# Patient Record
Sex: FEMALE | Race: WHITE | Employment: OTHER | URBAN - METROPOLITAN AREA
[De-identification: names, ages, dates, MRNs, and addresses within clinical notes are randomized per-mention and may not be internally consistent; named-entity substitution may affect disease eponyms.]

---

## 2018-07-23 ENCOUNTER — HOSPITAL ENCOUNTER (OUTPATIENT)
Dept: MRI IMAGING | Age: 73
Discharge: HOME | End: 2018-07-25
Payer: COMMERCIAL

## 2019-06-25 ENCOUNTER — CLINICAL DOCUMENTATION (OUTPATIENT)
Dept: FAMILY MEDICINE CLINIC | Age: 74
End: 2019-06-25

## 2019-06-26 ENCOUNTER — OFFICE VISIT (OUTPATIENT)
Dept: FAMILY MEDICINE CLINIC | Age: 74
End: 2019-06-26
Payer: COMMERCIAL

## 2019-06-26 VITALS
OXYGEN SATURATION: 97 % | HEIGHT: 66 IN | BODY MASS INDEX: 37.28 KG/M2 | WEIGHT: 232 LBS | HEART RATE: 72 BPM | DIASTOLIC BLOOD PRESSURE: 70 MMHG | SYSTOLIC BLOOD PRESSURE: 126 MMHG

## 2019-06-26 DIAGNOSIS — R10.84 GENERALIZED ABDOMINAL PAIN: Primary | ICD-10-CM

## 2019-06-26 DIAGNOSIS — F51.01 PRIMARY INSOMNIA: ICD-10-CM

## 2019-06-26 DIAGNOSIS — F41.9 ANXIETY: ICD-10-CM

## 2019-06-26 DIAGNOSIS — R05.9 COUGH: ICD-10-CM

## 2019-06-26 PROCEDURE — 99214 OFFICE O/P EST MOD 30 MIN: CPT | Performed by: INTERNAL MEDICINE

## 2019-06-26 RX ORDER — ZOLPIDEM TARTRATE 10 MG/1
TABLET ORAL
Qty: 30 TABLET | Refills: 2 | Status: SHIPPED | OUTPATIENT
Start: 2019-06-26 | End: 2019-08-19 | Stop reason: SDUPTHER

## 2019-06-26 RX ORDER — LABETALOL 100 MG/1
TABLET, FILM COATED ORAL
Refills: 2 | COMMUNITY
Start: 2019-05-23 | End: 2020-06-16 | Stop reason: SDUPTHER

## 2019-06-26 RX ORDER — LORAZEPAM 0.5 MG/1
0.5 TABLET ORAL NIGHTLY
COMMUNITY
End: 2019-06-26 | Stop reason: SDUPTHER

## 2019-06-26 RX ORDER — LORAZEPAM 0.5 MG/1
0.5 TABLET ORAL NIGHTLY
Qty: 30 TABLET | Refills: 2 | Status: SHIPPED | OUTPATIENT
Start: 2019-06-26 | End: 2021-06-22

## 2019-06-26 RX ORDER — OXYBUTYNIN CHLORIDE 5 MG/1
5 TABLET ORAL EVERY 6 HOURS PRN
Qty: 236 ML | Refills: 1 | Status: SHIPPED | OUTPATIENT
Start: 2019-06-26 | End: 2019-07-26

## 2019-06-26 RX ORDER — OXYBUTYNIN CHLORIDE 5 MG/1
TABLET ORAL
Refills: 0 | COMMUNITY
Start: 2019-04-02 | End: 2019-06-26 | Stop reason: SDUPTHER

## 2019-06-26 RX ORDER — ASCORBIC ACID 500 MG
TABLET ORAL
COMMUNITY
Start: 2005-11-30

## 2019-06-26 NOTE — PROGRESS NOTES
She is complaining of abdominal pain. She states that she has the pain in the epigastric area postprandially. Patient also states that she has severe pain with bowel movements. She denies any blood or mucus in the stool. She states that she gets lightheaded just does not feel very good has to lay down after bowel movements. Denies fever, chills, sweats. There is been a minimal amount of weight loss over the last 6 months. And states that she has had a colonoscopy with the last 7 or 8 years. She states that all of her colonoscopies x3 have been totally normal.  She does have a high degree of anxiety. HPI:    Current Meds: Eszopiclone 3 mg one by mouth every night at bedtime as needed  Cholestyramine 4 GM/Dose one scoop daily  Duloxetine HCL 60 mg 1 by mouth every day  Cheratussin ac 100-10 mg/5ml 5ml q6hr as needed  Levothyroxine Sodium 75 mcg 1 PO qd  Pantoprazole Sodium 40 mg 1 PO qd  Losartan Potassium 100 mg 1 by mouth every day  Furosemide 40 mg 1 by mouth bid  Potassium Gluconate 595 (99 k) mg  Amlodipine Besylate 10 mg take 1 tablet by mouth one time per day  Allergies: NKDA  PMH:  Problem List: Essential hypertension  Health Maintenance:  Influenza Vaccination - (2018)  Mammogram - 2017  Colonoscopy - about 5 yrs ago, has had 3 since 46  Medical Problems:  Hypothyroidism, Hypertension, Anxiety, Osteoarthritis  Pulmonary HTN - (2018) [facility]-RIGHT SIDED HEART CATH  Incontinence, Peripheral Neuropathy  Diastolic Dysfunction - (5230) CARDIOLOGY/PULMONARY CONSULTS   Sleep Apnea - (2018) RECENT ADJUSTMENT OF CPAP  Surgical Hx:  Cataract Removal, Multiple urologic surgeries including stim, Removal of Gallbladder, Appendectomy,  Partial Hysterectomy  Reviewed, no changes. FH:  Father:  . (Hx)  due to CAD; Throat Cancer. Mother:  . (Hx)  due to Old age ? unknown. Brother 1:  . (Hx)  due to Pulmonary fibrosis. Brother 2:  . (Hx)  due to MI - age 47.   Sister 1:  Coronary Artery Disease (CAD). Maternal Grandmother:  . (Hx)  due to Colon Cancer. Reviewed, no changes. SH:  Marital: Legal Status: . Personal Habits: Cigarette Use: Never Smoked Cigarettes. Alcohol: Social Very Rare. Drug Use:  Denies Drug Use. Reviewed, no changes. Date: 2019  Was the patient queried about smoking behavior? Yes No  Does the patient currently smoke? Smoking: Nonsmoker. Objective  BP: 128/86 Pulse: 88 O2SatR: 96%ra Ht: 66\" 5'6\" Ht cm: 167.6 Wt: 238lb Wt Prior: 241lb as of  18 Wt Dif: -3lb Wt k.957 Wt kg Prior: 109.318 as of 18 Wt kg Dif: -1.361 BMI: 38.4  BSA: 2.15  Exam:  Const: Appears healthy, well developed and well nourished. Appears morbidly obese. Eyes: EOMI in both eyes. PERRL. ENMT: External ears WNL. Tympanic membranes are intact. External nose WNL. Moistness and  normal color of the nasal mucosae. Septum is in the midline. Dentition is in good repair. Gums  appear healthy. Posterior pharynx shows no exudate, irritation or redness. Neck: Supple and symmetric. Palpation reveals no adenopathy. No masses appreciated. Thyroid:  no nodule appreciated or thyromegaly. No jugular venous distention. Resp: Respirations are unlabored. Respiration rate is normal. Auscultate good airflow. No rales,  rhonchi or wheezes appreciated over the lungs bilaterally. CV: Rhythm is regular. S1 is normal. S2 is accentuated. Carotids: no bruits. Abdominal aorta: not  palpable. Pedal pulses: 2+ and equal bilaterally. Extremities: No clubbing, cyanosis or edema. Abdomen: Bowel sounds are normoactive. Palpation reveals softness, with no distension,  organomegaly or tenderness. No abdominal masses palpable. No palpable hepatosplenomegaly. Musculo: Walks with a wide-based gait. Upper Extremities: ROM: Full ROM bilaterally. Lower  Extremities: ROM: Full ROM bilaterally. Skin: Dry and warm with no rash. Neuro: Alert and oriented x3.  Mood is normal. Affect is normal. Speech is articulate and fluent. DTR's are symmetric, intact and 2+ bilaterally. ........... Lord William Chavez was seen today for gi problem. Diagnoses and all orders for this visit:    Generalized abdominal pain  -     LORazepam (ATIVAN) 0.5 MG tablet; Take 1 tablet by mouth nightly for 90 days.  -     LIPASE; Future  -     COMPREHENSIVE METABOLIC PANEL; Future  -     CBC; Future  -     Sedimentation rate, automated; Future  -     External Referral To Gastroenterology    Anxiety  -     LORazepam (ATIVAN) 0.5 MG tablet; Take 1 tablet by mouth nightly for 90 days. Cough  -     VIRTUSSIN A/C 100-10 MG/5ML syrup; Take 5 mLs by mouth every 6 hours as needed for Cough for up to 30 days. Primary insomnia  -     zolpidem (AMBIEN) 10 MG tablet; TAKE ONE TABLET BY MOUTH EVERY DAY AT BEDTIME    Given her high degree of anxiety and somewhat nebulous symptomatology I believe this is probably a functional bowel problem. I will send her to GI for further evaluation. Really did not think it warranted EGD colonoscopy and CT of the abdomen and pelvis at this time especially with a negative examination. I will ask her to take VS L #3 and report to gastroenterology whether this has been helpful.

## 2019-06-26 NOTE — PROGRESS NOTES
Last office note copied from [facility] for Dr. Mariola Irwin [address] Acct#: [id number]  Suraj Malagon : 1945 Sex: F Age: 74 years  Subjective  CC: This is a patient with continued some postnasal drip and cough. Patient will be prescribed some  Flonase for this. Patient recently saw cardiology and she was started on Coreg for better blood pressure  control. She is doing much better in terms of performance status. She is less weak and less short of  breath. She did go to cardio pulmonary rehabilitation and now is having a  at home for  core strengthening. Patient does have chronic lymphedema but her weight is actually down I am  proceeding that her edema is actually a little bit less than it was previously. Complaining of insomnia. She does wear a CPAP. She would like a bigger dose of Ambien but I indicated to her that a 5 mg dose  is all that's FDA approved for insomnia and women. We've tried other medications without success. Patient is also complaining of occasional depression. She is on Celexa teen. There is no suicidal  ideation but she was inquiring about ketamine. Told her this is really not indicated in her case. HPI:  ROS:  Const: General health stated as fair. Eyes: Denies eye symptoms. ENMT: Denies ear symptoms. Denies nasal symptoms. CV: Reports hypertension and palpitations. Resp: Reports SOB and SOB with minimal activity, diastolic dysfunction with severely enlarged left  atrium. : Stress incontinence  Musculo: Reports arthralgias. Neuro: Peripheral neuropathy  Psych: Reports anxiety and depression. Endocrine: Reports obesity. Hema/Lymph: Denies hematologic symptoms.   Current Meds: Eszopiclone 3 mg one by mouth every night at bedtime as needed  Cholestyramine 4 GM/Dose one scoop daily  Duloxetine HCL 60 mg 1 by mouth every day  Cheratussin ac 100-10 mg/5ml 5ml q6hr as needed  Levothyroxine Sodium 75 mcg 1 PO qd  Pantoprazole Sodium 40 mg 1 PO qd  Losartan Potassium 100 mg 1 by mouth every day  Furosemide 40 mg 1 by mouth bid  Potassium Gluconate 595 (99 k) mg  Amlodipine Besylate 10 mg take 1 tablet by mouth one time per day  Allergies: NKDA  PMH:  Problem List: Essential hypertension  Health Maintenance:  Influenza Vaccination - (2018)  Mammogram - 2017  Colonoscopy - about 5 yrs ago, has had 3 since 46  Medical Problems:  Hypothyroidism, Hypertension, Anxiety, Osteoarthritis  Stacey Mccarthy  1945 Page #2  Pulmonary HTN - (2018) [facility]-RIGHT SIDED HEART CATH  Incontinence, Peripheral Neuropathy  Diastolic Dysfunction - () CARDIOLOGY/PULMONARY CONSULTS   Sleep Apnea - (2018) RECENT ADJUSTMENT OF CPAP  Surgical Hx:  Cataract Removal, Multiple urologic surgeries including stim, Removal of Gallbladder, Appendectomy,  Partial Hysterectomy  Reviewed, no changes. FH:  Father:  . (Hx)  due to CAD; Throat Cancer. Mother:  . (Hx)  due to Old age ? unknown. Brother 1:  . (Hx)  due to Pulmonary fibrosis. Brother 2:  . (Hx)  due to MI - age 47. Sister 1:  Coronary Artery Disease (CAD). Maternal Grandmother:  . (Hx)  due to Colon Cancer. Reviewed, no changes. SH:  Marital: Legal Status: . Personal Habits: Cigarette Use: Never Smoked Cigarettes. Alcohol: Social Very Rare. Drug Use:  Denies Drug Use. Reviewed, no changes. Date: 2019  Was the patient queried about smoking behavior? Yes No  Does the patient currently smoke? Smoking: Nonsmoker. Objective  BP: 128/86 Pulse: 88 O2SatR: 96%ra Ht: 66\" 5'6\" Ht cm: 167.6 Wt: 238lb Wt Prior: 241lb as of  18 Wt Dif: -3lb Wt k.957 Wt kg Prior: 109.318 as of 18 Wt kg Dif: -1.361 BMI: 38.4  BSA: 2.15  Exam:  Const: Appears healthy, well developed and well nourished. Appears morbidly obese. Eyes: EOMI in both eyes. PERRL. ENMT: External ears WNL. Tympanic membranes are intact. External nose WNL.  Moistness and  normal color of the nasal mucosae. Septum is in the midline. Dentition is in good repair. Gums  appear healthy. Posterior pharynx shows no exudate, irritation or redness. Neck: Supple and symmetric. Palpation reveals no adenopathy. No masses appreciated. Thyroid:  no nodule appreciated or thyromegaly. No jugular venous distention. Resp: Respirations are unlabored. Respiration rate is normal. Auscultate good airflow. No rales,  rhonchi or wheezes appreciated over the lungs bilaterally. CV: Rhythm is regular. S1 is normal. S2 is accentuated. Carotids: no bruits. Abdominal aorta: not  palpable. Pedal pulses: 2+ and equal bilaterally. Extremities: No clubbing, cyanosis or edema. Abdomen: Bowel sounds are normoactive. Palpation reveals softness, with no distension,  organomegaly or tenderness. No abdominal masses palpable. No palpable hepatosplenomegaly. Musculo: Walks with a wide-based gait. Upper Extremities: ROM: Full ROM bilaterally. Lower  Extremities: ROM: Full ROM bilaterally. Skin: Dry and warm with no rash. [facility]  1945 Page #3  Neuro: Alert and oriented x3. Mood is normal. Affect is normal. Speech is articulate and fluent. DTR's are symmetric, intact and 2+ bilaterally. Assessment #1: J30.9 Allergic rhinitis, unspecified  Care Plan:  Lab Orders : CMP W/GFR  Magnesium  Assessment #2: I10 Essential (primary) hypertension  Care Plan:  Lab Orders : CMP W/GFR  Magnesium  Assessment #3: G47.00 Insomnia, unspecified  Care Plan:  Med Current : Eszopiclone 3 mg  one by mouth every night at bedtime as needed  Follow Up : Followup:. Assessment #4: G47.30 Sleep apnea, unspecified  Care Plan:  Assessment #5: M15.0 Primary generalized (osteo)arthritis  Care Plan:  Assessment #6: Z68.38 Body mass index (BMI) 38.0-38.9, adult  Care Plan:  Patient is currently titrating dose of Coreg up. She does have follow-up with cardiology. Lab  work will be obtained today. Immunizations are up to date.

## 2019-07-01 ENCOUNTER — HOSPITAL ENCOUNTER (OUTPATIENT)
Age: 74
Discharge: HOME | End: 2019-07-03
Payer: COMMERCIAL

## 2019-07-01 DIAGNOSIS — R10.84 GENERALIZED ABDOMINAL PAIN: ICD-10-CM

## 2019-07-01 LAB
ALBUMIN SERPL-MCNC: 4.6 G/DL (ref 3.5–5.2)
ALP BLD-CCNC: 87 U/L (ref 35–104)
ALT SERPL-CCNC: 13 U/L (ref 0–32)
ANION GAP SERPL CALCULATED.3IONS-SCNC: 17 MMOL/L (ref 7–16)
AST SERPL-CCNC: 21 U/L (ref 0–31)
BILIRUB SERPL-MCNC: 0.4 MG/DL (ref 0–1.2)
BUN BLDV-MCNC: 13 MG/DL (ref 8–23)
CALCIUM SERPL-MCNC: 9.9 MG/DL (ref 8.6–10.2)
CHLORIDE BLD-SCNC: 98 MMOL/L (ref 98–107)
CO2: 22 MMOL/L (ref 22–29)
CREAT SERPL-MCNC: 1.4 MG/DL (ref 0.5–1)
GFR AFRICAN AMERICAN: 44
GFR NON-AFRICAN AMERICAN: 37 ML/MIN/1.73
GLUCOSE BLD-MCNC: 101 MG/DL (ref 74–99)
HCT VFR BLD CALC: 39.7 % (ref 34–48)
HEMOGLOBIN: 13 G/DL (ref 11.5–15.5)
LIPASE: 40 U/L (ref 13–60)
MCH RBC QN AUTO: 32.8 PG (ref 26–35)
MCHC RBC AUTO-ENTMCNC: 32.7 % (ref 32–34.5)
MCV RBC AUTO: 100.3 FL (ref 80–99.9)
PDW BLD-RTO: 13.3 FL (ref 11.5–15)
PLATELET # BLD: 365 E9/L (ref 130–450)
PMV BLD AUTO: 10.4 FL (ref 7–12)
POTASSIUM SERPL-SCNC: 4.8 MMOL/L (ref 3.5–5)
RBC # BLD: 3.96 E12/L (ref 3.5–5.5)
SEDIMENTATION RATE, ERYTHROCYTE: 13 MM/HR (ref 0–20)
SODIUM BLD-SCNC: 137 MMOL/L (ref 132–146)
TOTAL PROTEIN: 8 G/DL (ref 6.4–8.3)
WBC # BLD: 7.1 E9/L (ref 4.5–11.5)

## 2019-07-01 PROCEDURE — 83690 ASSAY OF LIPASE: CPT

## 2019-07-01 PROCEDURE — 80053 COMPREHEN METABOLIC PANEL: CPT

## 2019-07-01 PROCEDURE — 36415 COLL VENOUS BLD VENIPUNCTURE: CPT

## 2019-07-01 PROCEDURE — 85651 RBC SED RATE NONAUTOMATED: CPT

## 2019-07-01 PROCEDURE — 85027 COMPLETE CBC AUTOMATED: CPT

## 2019-07-02 ENCOUNTER — TELEPHONE (OUTPATIENT)
Dept: FAMILY MEDICINE CLINIC | Age: 74
End: 2019-07-02

## 2019-07-15 ENCOUNTER — TELEPHONE (OUTPATIENT)
Dept: FAMILY MEDICINE CLINIC | Age: 74
End: 2019-07-15

## 2019-08-07 ENCOUNTER — TELEPHONE (OUTPATIENT)
Dept: FAMILY MEDICINE CLINIC | Age: 74
End: 2019-08-07

## 2019-08-19 ENCOUNTER — TELEPHONE (OUTPATIENT)
Dept: FAMILY MEDICINE CLINIC | Age: 74
End: 2019-08-19

## 2019-08-19 DIAGNOSIS — F51.01 PRIMARY INSOMNIA: ICD-10-CM

## 2019-08-21 RX ORDER — ZOLPIDEM TARTRATE 10 MG/1
TABLET ORAL
Qty: 30 TABLET | Refills: 1 | Status: SHIPPED | OUTPATIENT
Start: 2019-08-21 | End: 2021-06-22 | Stop reason: SDUPTHER

## 2019-08-22 DIAGNOSIS — F51.01 PRIMARY INSOMNIA: ICD-10-CM

## 2019-08-22 RX ORDER — ZOLPIDEM TARTRATE 10 MG/1
TABLET ORAL
Qty: 30 TABLET | Refills: 1 | Status: SHIPPED | OUTPATIENT
Start: 2019-08-22 | End: 2019-10-15 | Stop reason: SDUPTHER

## 2019-10-11 RX ORDER — CARVEDILOL 6.25 MG/1
6.25 TABLET ORAL 2 TIMES DAILY WITH MEALS
COMMUNITY
End: 2019-10-15 | Stop reason: CLARIF

## 2019-10-11 RX ORDER — MAGNESIUM GLUCONATE 30 MG(550)
TABLET ORAL
COMMUNITY
End: 2019-10-15 | Stop reason: CLARIF

## 2019-10-11 RX ORDER — FLUTICASONE PROPIONATE 50 MCG
2 SPRAY, SUSPENSION (ML) NASAL DAILY
COMMUNITY
End: 2020-04-02 | Stop reason: SDUPTHER

## 2019-10-11 RX ORDER — CHOLESTYRAMINE 4 G/9G
1 POWDER, FOR SUSPENSION ORAL DAILY
COMMUNITY
End: 2019-10-15 | Stop reason: CLARIF

## 2019-10-11 RX ORDER — GUAIFENESIN AND CODEINE PHOSPHATE 100; 10 MG/5ML; MG/5ML
5 SOLUTION ORAL EVERY 6 HOURS
COMMUNITY
End: 2019-10-15 | Stop reason: SDUPTHER

## 2019-10-15 ENCOUNTER — OFFICE VISIT (OUTPATIENT)
Dept: FAMILY MEDICINE CLINIC | Age: 74
End: 2019-10-15
Payer: COMMERCIAL

## 2019-10-15 VITALS
OXYGEN SATURATION: 98 % | BODY MASS INDEX: 36.64 KG/M2 | HEART RATE: 83 BPM | WEIGHT: 227 LBS | SYSTOLIC BLOOD PRESSURE: 130 MMHG | DIASTOLIC BLOOD PRESSURE: 74 MMHG

## 2019-10-15 DIAGNOSIS — F51.01 PRIMARY INSOMNIA: ICD-10-CM

## 2019-10-15 DIAGNOSIS — R05.9 COUGH: Primary | ICD-10-CM

## 2019-10-15 DIAGNOSIS — G47.33 OBSTRUCTIVE SLEEP APNEA SYNDROME: ICD-10-CM

## 2019-10-15 DIAGNOSIS — R53.82 CHRONIC FATIGUE: ICD-10-CM

## 2019-10-15 DIAGNOSIS — I10 ESSENTIAL HYPERTENSION: ICD-10-CM

## 2019-10-15 PROBLEM — G47.30 SLEEP APNEA: Status: ACTIVE | Noted: 2019-10-15

## 2019-10-15 PROCEDURE — 99213 OFFICE O/P EST LOW 20 MIN: CPT | Performed by: INTERNAL MEDICINE

## 2019-10-15 RX ORDER — PANTOPRAZOLE SODIUM 40 MG/1
40 TABLET, DELAYED RELEASE ORAL DAILY
Qty: 90 TABLET | Refills: 1 | Status: SHIPPED | OUTPATIENT
Start: 2019-10-15 | End: 2019-12-17 | Stop reason: SDUPTHER

## 2019-10-15 RX ORDER — DULOXETIN HYDROCHLORIDE 60 MG/1
CAPSULE, DELAYED RELEASE ORAL
Qty: 90 CAPSULE | Refills: 1 | Status: SHIPPED
Start: 2019-10-15 | End: 2020-05-11

## 2019-10-15 RX ORDER — GUAIFENESIN AND CODEINE PHOSPHATE 100; 10 MG/5ML; MG/5ML
5 SOLUTION ORAL EVERY 6 HOURS
Qty: 600 ML | Refills: 1 | Status: SHIPPED | OUTPATIENT
Start: 2019-10-15 | End: 2019-11-14

## 2019-10-15 RX ORDER — ZOLPIDEM TARTRATE 10 MG/1
TABLET ORAL
Qty: 30 TABLET | Refills: 2 | Status: SHIPPED | OUTPATIENT
Start: 2019-10-15 | End: 2019-12-17 | Stop reason: SDUPTHER

## 2019-10-15 RX ORDER — LEVOTHYROXINE SODIUM 0.07 MG/1
TABLET ORAL
Qty: 90 TABLET | Refills: 1 | Status: SHIPPED
Start: 2019-10-15 | End: 2020-03-05 | Stop reason: SDUPTHER

## 2019-12-17 ENCOUNTER — OFFICE VISIT (OUTPATIENT)
Dept: FAMILY MEDICINE CLINIC | Age: 74
End: 2019-12-17
Payer: COMMERCIAL

## 2019-12-17 ENCOUNTER — HOSPITAL ENCOUNTER (OUTPATIENT)
Age: 74
Discharge: HOME | End: 2019-12-19
Payer: COMMERCIAL

## 2019-12-17 VITALS
OXYGEN SATURATION: 98 % | HEART RATE: 81 BPM | BODY MASS INDEX: 36.48 KG/M2 | WEIGHT: 226 LBS | DIASTOLIC BLOOD PRESSURE: 70 MMHG | SYSTOLIC BLOOD PRESSURE: 132 MMHG

## 2019-12-17 DIAGNOSIS — G47.33 OBSTRUCTIVE SLEEP APNEA SYNDROME: ICD-10-CM

## 2019-12-17 DIAGNOSIS — M54.16 LUMBAR RADICULOPATHY: ICD-10-CM

## 2019-12-17 DIAGNOSIS — R53.82 CHRONIC FATIGUE: ICD-10-CM

## 2019-12-17 DIAGNOSIS — F51.01 PRIMARY INSOMNIA: ICD-10-CM

## 2019-12-17 DIAGNOSIS — E03.9 ACQUIRED HYPOTHYROIDISM: ICD-10-CM

## 2019-12-17 DIAGNOSIS — K21.9 GASTROESOPHAGEAL REFLUX DISEASE WITHOUT ESOPHAGITIS: ICD-10-CM

## 2019-12-17 DIAGNOSIS — I10 ESSENTIAL HYPERTENSION: ICD-10-CM

## 2019-12-17 DIAGNOSIS — M54.16 LUMBAR RADICULOPATHY: Primary | ICD-10-CM

## 2019-12-17 LAB
ALBUMIN SERPL-MCNC: 4.5 G/DL (ref 3.5–5.2)
ALP BLD-CCNC: 80 U/L (ref 35–104)
ALT SERPL-CCNC: 15 U/L (ref 0–32)
ANION GAP SERPL CALCULATED.3IONS-SCNC: 22 MMOL/L (ref 7–16)
AST SERPL-CCNC: 20 U/L (ref 0–31)
BILIRUB SERPL-MCNC: 0.4 MG/DL (ref 0–1.2)
BUN BLDV-MCNC: 24 MG/DL (ref 8–23)
CALCIUM SERPL-MCNC: 9.9 MG/DL (ref 8.6–10.2)
CHLORIDE BLD-SCNC: 96 MMOL/L (ref 98–107)
CO2: 19 MMOL/L (ref 22–29)
CREAT SERPL-MCNC: 1.4 MG/DL (ref 0.5–1)
GFR AFRICAN AMERICAN: 44
GFR NON-AFRICAN AMERICAN: 37 ML/MIN/1.73
GLUCOSE BLD-MCNC: 81 MG/DL (ref 74–99)
POTASSIUM SERPL-SCNC: 4.1 MMOL/L (ref 3.5–5)
SODIUM BLD-SCNC: 137 MMOL/L (ref 132–146)
TOTAL PROTEIN: 8 G/DL (ref 6.4–8.3)

## 2019-12-17 PROCEDURE — 80053 COMPREHEN METABOLIC PANEL: CPT

## 2019-12-17 PROCEDURE — 99214 OFFICE O/P EST MOD 30 MIN: CPT | Performed by: INTERNAL MEDICINE

## 2019-12-17 PROCEDURE — 36415 COLL VENOUS BLD VENIPUNCTURE: CPT

## 2019-12-17 RX ORDER — ZOLPIDEM TARTRATE 10 MG/1
TABLET ORAL
Qty: 30 TABLET | Refills: 2 | Status: SHIPPED | OUTPATIENT
Start: 2019-12-17 | End: 2020-03-05 | Stop reason: SDUPTHER

## 2019-12-17 RX ORDER — METHYLPREDNISOLONE 4 MG/1
4 TABLET ORAL SEE ADMIN INSTRUCTIONS
COMMUNITY
End: 2020-06-16

## 2019-12-17 RX ORDER — PANTOPRAZOLE SODIUM 40 MG/1
40 TABLET, DELAYED RELEASE ORAL DAILY
Qty: 90 TABLET | Refills: 1 | Status: SHIPPED
Start: 2019-12-17 | End: 2020-10-07

## 2019-12-17 RX ORDER — GUAIFENESIN AND CODEINE PHOSPHATE 100; 10 MG/5ML; MG/5ML
5 SOLUTION ORAL 3 TIMES DAILY PRN
COMMUNITY
End: 2020-10-30

## 2019-12-17 ASSESSMENT — PATIENT HEALTH QUESTIONNAIRE - PHQ9
2. FEELING DOWN, DEPRESSED OR HOPELESS: 0
1. LITTLE INTEREST OR PLEASURE IN DOING THINGS: 0
SUM OF ALL RESPONSES TO PHQ9 QUESTIONS 1 & 2: 0
SUM OF ALL RESPONSES TO PHQ QUESTIONS 1-9: 0
SUM OF ALL RESPONSES TO PHQ QUESTIONS 1-9: 0

## 2019-12-23 ENCOUNTER — TELEPHONE (OUTPATIENT)
Dept: PRIMARY CARE CLINIC | Age: 74
End: 2019-12-23

## 2019-12-23 NOTE — TELEPHONE ENCOUNTER
Patient refused MRI due to metal implant in bladder. She did see Dr. Mynor Dickey today at [facility]. She got her first shot.       Electronically signed by Sada Hampton LPN on 44/34/26 at 0:04 PM

## 2019-12-23 NOTE — TELEPHONE ENCOUNTER
LVM to [facility];    Dr Emmanuel Vines ordered a back MRI on patient. When [facility] contacted her to schedule, she refused test. Dr Emmanuel Vines wants to know why.

## 2020-03-05 RX ORDER — LORAZEPAM 0.5 MG/1
0.5 TABLET ORAL NIGHTLY
Qty: 90 TABLET | Refills: 0 | Status: SHIPPED | OUTPATIENT
Start: 2020-03-05 | End: 2020-06-16 | Stop reason: SDUPTHER

## 2020-03-05 RX ORDER — LEVOTHYROXINE SODIUM 0.07 MG/1
TABLET ORAL
Qty: 90 TABLET | Refills: 1 | Status: SHIPPED
Start: 2020-03-05 | End: 2020-10-26

## 2020-03-05 RX ORDER — ZOLPIDEM TARTRATE 10 MG/1
TABLET ORAL
Qty: 30 TABLET | Refills: 2 | Status: SHIPPED
Start: 2020-03-05 | End: 2020-07-31

## 2020-03-05 NOTE — TELEPHONE ENCOUNTER
The patient is overusing her Ambien. I will not fill it ahead of time. It is dangerous for her to be using excessive amounts of this medication.

## 2020-03-05 NOTE — TELEPHONE ENCOUNTER
Last Appointment:  12/17/2019  Future Appointments   Date Time Provider Lorri Velascoi   6/16/2020  1:30 PM MD José Anderson Ramírez [facility]      Patient calling in requesting refill on pended medication, also wanted to check about the Ambien, showed to end on 4/15/2020, but patient wants to know if this could be refilled as to she will run out before her next appt and would also like a refill on the Ativan, meds pended please review,

## 2020-04-02 ENCOUNTER — TELEPHONE (OUTPATIENT)
Dept: PRIMARY CARE CLINIC | Age: 75
End: 2020-04-02

## 2020-04-02 RX ORDER — NITROFURANTOIN 25; 75 MG/1; MG/1
100 CAPSULE ORAL 2 TIMES DAILY
Qty: 20 CAPSULE | Refills: 0 | Status: SHIPPED | OUTPATIENT
Start: 2020-04-02 | End: 2020-04-12

## 2020-04-02 RX ORDER — FLUTICASONE PROPIONATE 50 MCG
2 SPRAY, SUSPENSION (ML) NASAL DAILY
Qty: 1 BOTTLE | Refills: 5 | Status: SHIPPED
Start: 2020-04-02 | End: 2021-04-26

## 2020-04-02 NOTE — TELEPHONE ENCOUNTER
A prescription for Macrobid was sent to the pharmacy.   If she is not improved she will need to be seen through express

## 2020-05-11 RX ORDER — DULOXETIN HYDROCHLORIDE 60 MG/1
CAPSULE, DELAYED RELEASE ORAL
Qty: 90 CAPSULE | Refills: 1 | Status: SHIPPED
Start: 2020-05-11 | End: 2020-11-17

## 2020-06-16 ENCOUNTER — HOSPITAL ENCOUNTER (OUTPATIENT)
Age: 75
Discharge: HOME | End: 2020-06-18
Payer: MEDICARE

## 2020-06-16 ENCOUNTER — OFFICE VISIT (OUTPATIENT)
Dept: FAMILY MEDICINE CLINIC | Age: 75
End: 2020-06-16
Payer: MEDICARE

## 2020-06-16 VITALS
HEART RATE: 78 BPM | TEMPERATURE: 97.9 F | DIASTOLIC BLOOD PRESSURE: 78 MMHG | HEIGHT: 66 IN | BODY MASS INDEX: 37.93 KG/M2 | OXYGEN SATURATION: 95 % | WEIGHT: 236 LBS | SYSTOLIC BLOOD PRESSURE: 128 MMHG

## 2020-06-16 PROBLEM — F41.9 ANXIETY: Status: ACTIVE | Noted: 2020-06-16

## 2020-06-16 LAB
ALBUMIN SERPL-MCNC: 4.3 G/DL (ref 3.5–5.2)
ALP BLD-CCNC: 81 U/L (ref 35–104)
ALT SERPL-CCNC: 15 U/L (ref 0–32)
ANION GAP SERPL CALCULATED.3IONS-SCNC: 13 MMOL/L (ref 7–16)
AST SERPL-CCNC: 27 U/L (ref 0–31)
BACTERIA: ABNORMAL /HPF
BASOPHILS ABSOLUTE: 0.15 E9/L (ref 0–0.2)
BASOPHILS RELATIVE PERCENT: 1.7 % (ref 0–2)
BILIRUB SERPL-MCNC: 0.4 MG/DL (ref 0–1.2)
BILIRUBIN URINE: NEGATIVE
BLOOD, URINE: NEGATIVE
BUN BLDV-MCNC: 17 MG/DL (ref 8–23)
CALCIUM SERPL-MCNC: 9.9 MG/DL (ref 8.6–10.2)
CHLORIDE BLD-SCNC: 99 MMOL/L (ref 98–107)
CHOLESTEROL, TOTAL: 204 MG/DL (ref 0–199)
CLARITY: CLEAR
CO2: 24 MMOL/L (ref 22–29)
COLOR: YELLOW
CREAT SERPL-MCNC: 1.3 MG/DL (ref 0.5–1)
EOSINOPHILS ABSOLUTE: 0.58 E9/L (ref 0.05–0.5)
EOSINOPHILS RELATIVE PERCENT: 6.4 % (ref 0–6)
GFR AFRICAN AMERICAN: 48
GFR NON-AFRICAN AMERICAN: 40 ML/MIN/1.73
GLUCOSE BLD-MCNC: 92 MG/DL (ref 74–99)
GLUCOSE URINE: NEGATIVE MG/DL
HCT VFR BLD CALC: 39.6 % (ref 34–48)
HDLC SERPL-MCNC: 68 MG/DL
HEMOGLOBIN: 12.9 G/DL (ref 11.5–15.5)
IMMATURE GRANULOCYTES #: 0.02 E9/L
IMMATURE GRANULOCYTES %: 0.2 % (ref 0–5)
KETONES, URINE: NEGATIVE MG/DL
LDL CHOLESTEROL CALCULATED: 108 MG/DL (ref 0–99)
LEUKOCYTE ESTERASE, URINE: ABNORMAL
LYMPHOCYTES ABSOLUTE: 2.85 E9/L (ref 1.5–4)
LYMPHOCYTES RELATIVE PERCENT: 31.5 % (ref 20–42)
MCH RBC QN AUTO: 32.7 PG (ref 26–35)
MCHC RBC AUTO-ENTMCNC: 32.6 % (ref 32–34.5)
MCV RBC AUTO: 100.5 FL (ref 80–99.9)
MONOCYTES ABSOLUTE: 1.09 E9/L (ref 0.1–0.95)
MONOCYTES RELATIVE PERCENT: 12 % (ref 2–12)
NEUTROPHILS ABSOLUTE: 4.36 E9/L (ref 1.8–7.3)
NEUTROPHILS RELATIVE PERCENT: 48.2 % (ref 43–80)
NITRITE, URINE: NEGATIVE
PDW BLD-RTO: 14 FL (ref 11.5–15)
PH UA: 6.5 (ref 5–9)
PLATELET # BLD: 398 E9/L (ref 130–450)
PMV BLD AUTO: 9.8 FL (ref 7–12)
POTASSIUM SERPL-SCNC: 4.7 MMOL/L (ref 3.5–5)
PROTEIN UA: NEGATIVE MG/DL
RBC # BLD: 3.94 E12/L (ref 3.5–5.5)
RBC UA: ABNORMAL /HPF (ref 0–2)
SODIUM BLD-SCNC: 136 MMOL/L (ref 132–146)
SPECIFIC GRAVITY UA: 1.01 (ref 1–1.03)
TOTAL PROTEIN: 7.9 G/DL (ref 6.4–8.3)
TRIGL SERPL-MCNC: 141 MG/DL (ref 0–149)
TSH SERPL DL<=0.05 MIU/L-ACNC: 2.76 UIU/ML (ref 0.27–4.2)
UROBILINOGEN, URINE: 0.2 E.U./DL
VLDLC SERPL CALC-MCNC: 28 MG/DL
WBC # BLD: 9.1 E9/L (ref 4.5–11.5)
WBC UA: ABNORMAL /HPF (ref 0–5)

## 2020-06-16 PROCEDURE — 99214 OFFICE O/P EST MOD 30 MIN: CPT | Performed by: INTERNAL MEDICINE

## 2020-06-16 PROCEDURE — 85025 COMPLETE CBC W/AUTO DIFF WBC: CPT

## 2020-06-16 PROCEDURE — 80061 LIPID PANEL: CPT

## 2020-06-16 PROCEDURE — 84443 ASSAY THYROID STIM HORMONE: CPT

## 2020-06-16 PROCEDURE — 80053 COMPREHEN METABOLIC PANEL: CPT

## 2020-06-16 PROCEDURE — 36415 COLL VENOUS BLD VENIPUNCTURE: CPT

## 2020-06-16 PROCEDURE — 81003 URINALYSIS AUTO W/O SCOPE: CPT | Performed by: INTERNAL MEDICINE

## 2020-06-16 PROCEDURE — 81001 URINALYSIS AUTO W/SCOPE: CPT

## 2020-06-16 RX ORDER — LORAZEPAM 0.5 MG/1
0.5 TABLET ORAL EVERY 8 HOURS PRN
Qty: 90 TABLET | Refills: 3 | Status: SHIPPED | OUTPATIENT
Start: 2020-06-16 | End: 2020-12-18

## 2020-06-16 RX ORDER — FUROSEMIDE 20 MG/1
40 TABLET ORAL DAILY
Qty: 60 TABLET | Refills: 0 | Status: SHIPPED
Start: 2020-06-16 | End: 2020-07-31

## 2020-06-16 RX ORDER — LABETALOL 100 MG/1
TABLET, FILM COATED ORAL
Qty: 60 TABLET | Refills: 5 | Status: SHIPPED
Start: 2020-06-16 | End: 2021-04-26

## 2020-06-16 RX ORDER — AMLODIPINE BESYLATE 10 MG/1
10 TABLET ORAL DAILY
COMMUNITY
End: 2020-12-18 | Stop reason: SDUPTHER

## 2020-06-16 NOTE — PROGRESS NOTES
Patient did get relief of her lumbar radicular symptoms with epidural injections by Dr. Liset Villalta. Patient is clinically euthyroid. Patient is using her CPAP with good result although she does complain of chronic fatigue which is been a longstanding problem. Patient denies any increased edema. She denies orthopnea or PND. She does have chronic bowel problems. Mostly constipation predominant. She does use MiraLAX and fiber on a regular basis. This was felt to be secondary to her somewhat tortuous colon from her multiple surgeries. She did see urology regarding dysuria and the work-up was negative. HPI:    Current Outpatient Medications:     amLODIPine (NORVASC) 10 MG tablet, Take 10 mg by mouth daily, Disp: , Rfl:     LORazepam (ATIVAN) 0.5 MG tablet, Take 1 tablet by mouth every 8 hours as needed for Anxiety for up to 30 days. , Disp: 90 tablet, Rfl: 3    labetalol (NORMODYNE) 100 MG tablet, TAKE ONE TABLET BY MOUTH TWO TIMES A DAY, Disp: 60 tablet, Rfl: 5    furosemide (LASIX) 20 MG tablet, Take 2 tablets by mouth daily, Disp: 60 tablet, Rfl: 0    DULoxetine (CYMBALTA) 60 MG extended release capsule, TAKE ONE CAPSULE BY MOUTH EVERY DAY, Disp: 90 capsule, Rfl: 1    fluticasone (FLONASE) 50 MCG/ACT nasal spray, 2 sprays by Each Nostril route daily, Disp: 1 Bottle, Rfl: 5    levothyroxine (SYNTHROID) 75 MCG tablet, TAKE ONE TABLET BY MOUTH EVERY DAY, Disp: 90 tablet, Rfl: 1    zolpidem (AMBIEN) 10 MG tablet, TAKE ONE TABLET BY MOUTH EVERY DAY AT BEDTIME, Disp: 30 tablet, Rfl: 2    pantoprazole (PROTONIX) 40 MG tablet, Take 1 tablet by mouth daily, Disp: 90 tablet, Rfl: 1    Multiple Vitamin (MVI, CELEBRATE, CHEWABLE TABLET), Take one(1) tablet daily. , Disp: , Rfl:     vitamin C (ASCORBIC ACID) 500 MG tablet, Take by mouth, Disp: , Rfl:     losartan (COZAAR) 100 MG tablet, Take 100 mg by mouth , Disp: , Rfl:     potassium chloride (KLOR-CON) 20 MEQ packet, Take 40 mEq by mouth daily, Disp: , Rfl:    guaiFENesin-codeine (TUSSI-ORGANIDIN NR) 100-10 MG/5ML syrup, Take 5 mLs by mouth 3 times daily as needed. , Disp: , Rfl:   Allergies: NKDA  PMH:  Problem List: Essential hypertension  Health Maintenance:  Influenza Vaccination - (2018)  Mammogram -   Colonoscopy -  Medical Problems:  Hypothyroidism, Hypertension, Anxiety, Osteoarthritis  Pulmonary HTN - (2018) [facility]-RIGHT SIDED HEART CATH  Incontinence, Peripheral Neuropathy  Diastolic Dysfunction - () CARDIOLOGY/PULMONARY CONSULTS   Sleep Apnea - (10/15/2019) RECENT ADJUSTMENT OF CPAP  Surgical Hx:  Cataract Removal, Multiple urologic surgeries including stim, Removal of Gallbladder, Appendectomy,  Partial Hysterectomy  Reviewed, no changes. FH:  Father:  . (Hx)  due to CAD; Throat Cancer. Mother:  . (Hx)  due to Old age ? unknown. Brother 1:  . (Hx)  due to Pulmonary fibrosis. Brother 2:  . (Hx)  due to MI - age 47. Sister 1:  Coronary Artery Disease (CAD). Maternal Grandmother:  . (Hx)  due to Colon Cancer. Reviewed, no changes. SH:  Marital: Legal Status: . Personal Habits: Cigarette Use: Never Smoked Cigarettes. Alcohol: Social Very Rare. Drug Use:  Denies Drug Use. Reviewed, no changes. Date: 2020  Was the patient queried about smoking behavior? Yes   Does the patient currently smoke? Smoking: Nonsmoker. Vitals:    20 1343   BP: 128/78   Pulse: 78   Temp: 97.9 °F (36.6 °C)   SpO2: 95%     Exam:  Const: Appears healthy, well developed and well nourished. Appears morbidly obese. Eyes: EOMI in both eyes. PERRL. ENMT: External ears WNL. Tympanic membranes are intact. External nose WNL. Moistness and  normal color of the nasal mucosae. Septum is in the midline. Dentition is in good repair. Gums  appear healthy. Posterior pharynx shows no exudate, irritation or redness. Neck: Supple and symmetric. Palpation reveals no adenopathy. No masses appreciated.  Thyroid:  no

## 2020-08-20 ENCOUNTER — OFFICE VISIT (OUTPATIENT)
Dept: FAMILY MEDICINE CLINIC | Age: 75
End: 2020-08-20
Payer: MEDICARE

## 2020-08-20 VITALS
OXYGEN SATURATION: 97 % | RESPIRATION RATE: 18 BRPM | SYSTOLIC BLOOD PRESSURE: 138 MMHG | DIASTOLIC BLOOD PRESSURE: 74 MMHG | BODY MASS INDEX: 36.8 KG/M2 | HEIGHT: 66 IN | HEART RATE: 81 BPM | TEMPERATURE: 97.4 F | WEIGHT: 229 LBS

## 2020-08-20 LAB
BILIRUBIN, POC: NEGATIVE
BLOOD URINE, POC: NEGATIVE
CHP ED QC CHECK: NORMAL
CLARITY, POC: CLEAR
COLOR, POC: YELLOW
GLUCOSE BLD-MCNC: 130 MG/DL
GLUCOSE URINE, POC: NEGATIVE
KETONES, POC: NEGATIVE
LEUKOCYTE EST, POC: NORMAL
NITRITE, POC: NEGATIVE
PH, POC: 6
PROTEIN, POC: NEGATIVE
SPECIFIC GRAVITY, POC: 1.02
UROBILINOGEN, POC: 0.2

## 2020-08-20 PROCEDURE — 82962 GLUCOSE BLOOD TEST: CPT | Performed by: PHYSICIAN ASSISTANT

## 2020-08-20 PROCEDURE — 93000 ELECTROCARDIOGRAM COMPLETE: CPT | Performed by: PHYSICIAN ASSISTANT

## 2020-08-20 PROCEDURE — 99214 OFFICE O/P EST MOD 30 MIN: CPT | Performed by: PHYSICIAN ASSISTANT

## 2020-08-20 PROCEDURE — 81002 URINALYSIS NONAUTO W/O SCOPE: CPT | Performed by: PHYSICIAN ASSISTANT

## 2020-08-20 NOTE — PROGRESS NOTES
20  Madhu Gibson : 1945 Sex: female  Age 66 y.o. Subjective:  Chief Complaint   Patient presents with    Rash     pt states rash on face and chest, feels dizzy and hard time sleeping for over a week          HPI:   Madhu Gibson , 66 y.o. female presents to express care for evaluation of dizziness, nausea, rash, weakness. The patient states that she has not felt well for about a week. She did have a hypoglycemic event last week and drink some orange juice and seemed to get a little bit better. The patient is having some dizziness, nausea, she is also having a hard time getting some of her words out. The patient states that she feels very unsteady on her feet. Unfortunately she did drive to the office here today. She is not with any family members. The patient does have a rash that she is been treated with some prednisone. Patient believes it was 10 mg of prednisone. ROS:   Unless otherwise stated in this report the patient's positive and negative responses for review of systems for constitutional, eyes, ENT, cardiovascular, respiratory, gastrointestinal, neurological, , musculoskeletal, and integument systems and related systems to the presenting problem are either stated in the history of present illness or were not pertinent or were negative for the symptoms and/or complaints related to the presenting medical problem. Positives and pertinent negatives as per HPI. All others reviewed and are negative.       PMH:     Past Medical History:   Diagnosis Date    Anxiety     Arthritis     Diastolic dysfunction     CARDIOLOGY/PULMONARY CONSULTS, 2018    Hypertension     Hypothyroidism     Neuropathy     PERIPHERAL    Osteoarthritis     Sleep apnea     RECENT ADJUSTMENT OF CPAP    Urinary incontinence        Past Surgical History:   Procedure Laterality Date    APPENDECTOMY      BLADDER SURGERY      CARDIAC CATHETERIZATION Right 2018    CATARACT REMOVAL  CHOLECYSTECTOMY      HYSTERECTOMY      OTHER SURGICAL HISTORY      multiple urologic surgeries including stim       Family History   Problem Relation Age of Onset    No Known Problems Mother     Cancer Father         larynx    Heart Disease Father     Coronary Art Dis Father     Other Brother         pulmonary fibrosis    Heart Attack Brother     Coronary Art Dis Sister     Colon Cancer Maternal Grandmother        Medications:     Current Outpatient Medications:     zolpidem (AMBIEN) 10 MG tablet, TAKE ONE TABLET BY MOUTH EVERY DAY AT BEDTIME, Disp: 30 tablet, Rfl: 5    furosemide (LASIX) 20 MG tablet, TAKE TWO TABLETS BY MOUTH DAILY, Disp: 60 tablet, Rfl: 5    amLODIPine (NORVASC) 10 MG tablet, Take 10 mg by mouth daily, Disp: , Rfl:     labetalol (NORMODYNE) 100 MG tablet, TAKE ONE TABLET BY MOUTH TWO TIMES A DAY, Disp: 60 tablet, Rfl: 5    DULoxetine (CYMBALTA) 60 MG extended release capsule, TAKE ONE CAPSULE BY MOUTH EVERY DAY, Disp: 90 capsule, Rfl: 1    fluticasone (FLONASE) 50 MCG/ACT nasal spray, 2 sprays by Each Nostril route daily, Disp: 1 Bottle, Rfl: 5    levothyroxine (SYNTHROID) 75 MCG tablet, TAKE ONE TABLET BY MOUTH EVERY DAY, Disp: 90 tablet, Rfl: 1    pantoprazole (PROTONIX) 40 MG tablet, Take 1 tablet by mouth daily, Disp: 90 tablet, Rfl: 1    guaiFENesin-codeine (TUSSI-ORGANIDIN NR) 100-10 MG/5ML syrup, Take 5 mLs by mouth 3 times daily as needed. , Disp: , Rfl:     Multiple Vitamin (MVI, CELEBRATE, CHEWABLE TABLET), Take one(1) tablet daily. , Disp: , Rfl:     vitamin C (ASCORBIC ACID) 500 MG tablet, Take by mouth, Disp: , Rfl:     losartan (COZAAR) 100 MG tablet, Take 100 mg by mouth , Disp: , Rfl:     potassium chloride (KLOR-CON) 20 MEQ packet, Take 40 mEq by mouth daily, Disp: , Rfl:     Allergies:      Allergies   Allergen Reactions    Fesoterodine Rash       Social History:     Social History     Tobacco Use    Smoking status: Never Smoker    Smokeless tobacco: Never Used   Substance Use Topics    Alcohol use: Yes     Comment: Socially    Drug use: No       Patient lives at home. Physical Exam:     Vitals:    08/20/20 1224   BP: 138/74   Pulse: 81   Resp: 18   Temp: 97.4 °F (36.3 °C)   SpO2: 97%   Weight: 229 lb (103.9 kg)   Height: 5' 6\" (1.676 m)       Exam:  Physical Exam  Nurses note and vital signs reviewed and patient is not hypoxic. General: The patient appears well and in no apparent distress. Patient is resting comfortably on cart. Skin: Warm, dry, no pallor noted. Macular rash noted to the left forehead, the cheeks of the face, the anterior chest wall  Head: Normocephalic, atraumatic. Eye: Normal conjunctiva  Ears, Nose, Mouth, and Throat: Oral mucosa is moist, tympanic membranes are clear bilaterally, mild wax noted in the right external canal  Cardiovascular: Regular Rate and Rhythm  Respiratory: Patient is in no distress, no accessory muscle use, lungs are clear to auscultation, no wheezing, crackles or rhonchi  Back: Non-tender, no CVA tenderness bilaterally to percussion. GI: Normal bowel sounds, no tenderness to palpation, no masses appreciated. No rebound, guarding, or rigidity noted. Musculoskeletal: The patient has no evidence of calf tenderness, no pitting edema, symmetrical pulses noted bilaterally  Neurological: A&O x4, normal speech, unsteady on her feet, seems to move all 4 extremities equally and symmetrically. Testing:     Results for orders placed or performed in visit on 08/20/20   POCT Urinalysis no Micro   Result Value Ref Range    Color, UA yellow     Clarity, UA clear     Glucose, UA POC negative     Bilirubin, UA negative     Ketones, UA negative     Spec Grav, UA 1.020     Blood, UA POC negative     pH, UA 6.0     Protein, UA POC negative     Urobilinogen, UA 0.2     Leukocytes, UA trace     Nitrite, UA negative    POCT Glucose   Result Value Ref Range    Glucose 130 mg/dL    QC OK?              Medical Decision Making: Vital signs reviewed    Past medical history reviewed. Allergies reviewed. Medications reviewed. Patient on arrival does not appear to be in any apparent distress or discomfort. The patient had a glucose obtained here that was 130. The patient was able to provide us with a urinalysis that showed trace leukocytes but no significant evidence of a urinary tract infection. No significant signs of dehydration. The patient is very unsteady on her feet. The patient states that she just does not feel right. She is described some difficulty with getting her words out. Concerned that there is further etiology causing these symptoms that would recommend that the patient be evaluated in the emergency department. The patient is adamantly refusing EMS transport. The patient was going to drive herself. I do not feel comfortable with her driving herself to the hospital.  We did get in contact with her son who is, come and pick her up and take her to the emergency department. EKG shows normal sinus rhythm, rate of 76, indeterminate axis, baseline artifact, no ST segment elevation, no depression, T wave inversion in lead III, no ectopy, normal intervals. The patient's granddaughter apparently will come and  the patient in transport to the ED. They still do not want EMS transport. Family arrived to transport the patient to the ED. Report was called by nursing staff here and discussed and we faxed over the EKG. Clinical Impression:   Rocky Sands was seen today for rash. Diagnoses and all orders for this visit:    Fatigue, unspecified type  -     POCT Urinalysis no Micro  -     POCT Glucose    Dizziness  -     EKG 12 lead; Future  -     EKG 12 lead    Nausea    Dysarthria    Balance problem      go directly to the emergency department.      SIGNATURE: Curtis Brown III, PA-C

## 2020-08-25 ENCOUNTER — TELEPHONE (OUTPATIENT)
Dept: ADMINISTRATIVE | Age: 75
End: 2020-08-25

## 2020-08-25 NOTE — TELEPHONE ENCOUNTER
Patient needs hospital follow up within 5-7 days with Dr Esperanza Cameron, no open slots please call her and advise Thanks

## 2020-09-17 ENCOUNTER — TELEPHONE (OUTPATIENT)
Dept: FAMILY MEDICINE CLINIC | Age: 75
End: 2020-09-17

## 2020-09-17 NOTE — TELEPHONE ENCOUNTER
Called pt and advised that she would need to go to the flu clinic.  We cannot see sick patients in the office unfortunately

## 2020-09-17 NOTE — TELEPHONE ENCOUNTER
[facility] calling to report a sinus infection that she has had for 2 weeks now. The side of her face is hot and swollen, and even her teeth are hurting. I did suggest that she go to the clinic, but she would rather you call in something for her.

## 2020-10-06 NOTE — TELEPHONE ENCOUNTER
Last Appointment:  6/16/2020  Future Appointments   Date Time Provider Lorri Fabby   12/18/2020 11:30 AM MD REHANA [facility]    refill requested.

## 2020-10-07 RX ORDER — PANTOPRAZOLE SODIUM 40 MG/1
TABLET, DELAYED RELEASE ORAL
Qty: 90 TABLET | Refills: 0 | Status: SHIPPED
Start: 2020-10-07 | End: 2020-10-15

## 2020-10-15 RX ORDER — PANTOPRAZOLE SODIUM 40 MG/1
TABLET, DELAYED RELEASE ORAL
Qty: 90 TABLET | Refills: 0 | Status: SHIPPED
Start: 2020-10-15 | End: 2020-12-18 | Stop reason: SDUPTHER

## 2020-10-26 RX ORDER — LEVOTHYROXINE SODIUM 0.07 MG/1
TABLET ORAL
Qty: 90 TABLET | Refills: 0 | Status: SHIPPED
Start: 2020-10-26 | End: 2020-12-18 | Stop reason: SDUPTHER

## 2020-10-31 RX ORDER — GUAIFENESIN AND CODEINE PHOSPHATE 10; 100 MG/5ML; MG/5ML
LIQUID ORAL
Qty: 600 ML | Refills: 0 | Status: SHIPPED
Start: 2020-10-31 | End: 2021-04-26

## 2020-11-04 ENCOUNTER — HOSPITAL ENCOUNTER (OUTPATIENT)
Age: 75
Discharge: HOME | End: 2020-11-06

## 2020-11-04 PROCEDURE — 88300 SURGICAL PATH GROSS: CPT

## 2020-11-17 NOTE — TELEPHONE ENCOUNTER
Last Appointment:  6/16/2020  Future Appointments   Date Time Provider Lorri Sequeira   12/18/2020 11:30 AM Daphney Manjarrez  [address]

## 2020-11-18 RX ORDER — DULOXETIN HYDROCHLORIDE 60 MG/1
CAPSULE, DELAYED RELEASE ORAL
Qty: 90 CAPSULE | Refills: 1 | Status: SHIPPED
Start: 2020-11-18 | End: 2021-04-26

## 2020-12-11 ENCOUNTER — TELEPHONE (OUTPATIENT)
Dept: PHARMACY | Facility: CLINIC | Age: 75
End: 2020-12-11

## 2020-12-11 RX ORDER — ROSUVASTATIN CALCIUM 40 MG/1
20 TABLET, COATED ORAL EVERY EVENING
COMMUNITY
End: 2021-02-05 | Stop reason: SDUPTHER

## 2020-12-11 NOTE — TELEPHONE ENCOUNTER
needs to be changed. Will defer sending updated prescription request to prescriber Micaela Hayes DO) since appears this medication was started at discharge from [facility] (reviewed discharge notes). Unsure if PCP will take over? Will patt for f/u on 1/20/21 to obtain new/updated prescription prior to first fill in 2021. Ganga Keys, PharmD, [facility]  Direct: [phone]  Department, toll free [phone], option 7           For Pharmacy Admin Tracking Only    PHSO: Yes  Total # of Interventions Recommended: 2  - New Order #: 0 New Medication Order Reason(s): Adherence  - Refills Provided #: 0  - Updated Order #: 1 Updated Order Reason(s):  Other  - Maintenance Safety Lab Monitoring #: 0  - New Therapy Lab Monitoring #: 1  Recommended intervention potential cost savings: 1  Total Interventions Accepted: 1  Time Spent (min): 15

## 2020-12-18 ENCOUNTER — OFFICE VISIT (OUTPATIENT)
Dept: FAMILY MEDICINE CLINIC | Age: 75
End: 2020-12-18
Payer: MEDICARE

## 2020-12-18 VITALS
SYSTOLIC BLOOD PRESSURE: 110 MMHG | BODY MASS INDEX: 37.45 KG/M2 | HEART RATE: 65 BPM | TEMPERATURE: 97.6 F | WEIGHT: 232 LBS | DIASTOLIC BLOOD PRESSURE: 60 MMHG | RESPIRATION RATE: 16 BRPM | OXYGEN SATURATION: 97 %

## 2020-12-18 DIAGNOSIS — E03.9 ACQUIRED HYPOTHYROIDISM: ICD-10-CM

## 2020-12-18 DIAGNOSIS — E78.2 MIXED HYPERLIPIDEMIA: ICD-10-CM

## 2020-12-18 PROBLEM — E66.01 MORBIDLY OBESE (HCC): Status: ACTIVE | Noted: 2020-12-18

## 2020-12-18 LAB
ALBUMIN SERPL-MCNC: 4.1 G/DL (ref 3.5–5.2)
ALP BLD-CCNC: 64 U/L (ref 35–104)
ALT SERPL-CCNC: 11 U/L (ref 0–32)
ANION GAP SERPL CALCULATED.3IONS-SCNC: 16 MMOL/L (ref 7–16)
AST SERPL-CCNC: 22 U/L (ref 0–31)
BILIRUB SERPL-MCNC: 0.4 MG/DL (ref 0–1.2)
BUN BLDV-MCNC: 14 MG/DL (ref 8–23)
CALCIUM SERPL-MCNC: 9.6 MG/DL (ref 8.6–10.2)
CHLORIDE BLD-SCNC: 101 MMOL/L (ref 98–107)
CHOLESTEROL, TOTAL: 143 MG/DL (ref 0–199)
CO2: 19 MMOL/L (ref 22–29)
CREAT SERPL-MCNC: 1.2 MG/DL (ref 0.5–1)
GFR AFRICAN AMERICAN: 53
GFR NON-AFRICAN AMERICAN: 44 ML/MIN/1.73
GLUCOSE BLD-MCNC: 76 MG/DL (ref 74–99)
HDLC SERPL-MCNC: 65 MG/DL
LDL CHOLESTEROL CALCULATED: 58 MG/DL (ref 0–99)
POTASSIUM SERPL-SCNC: 4.4 MMOL/L (ref 3.5–5)
SODIUM BLD-SCNC: 136 MMOL/L (ref 132–146)
TOTAL PROTEIN: 7.2 G/DL (ref 6.4–8.3)
TRIGL SERPL-MCNC: 100 MG/DL (ref 0–149)
TSH SERPL DL<=0.05 MIU/L-ACNC: 2.29 UIU/ML (ref 0.27–4.2)
VLDLC SERPL CALC-MCNC: 20 MG/DL

## 2020-12-18 PROCEDURE — 90694 VACC AIIV4 NO PRSRV 0.5ML IM: CPT | Performed by: INTERNAL MEDICINE

## 2020-12-18 PROCEDURE — 3288F FALL RISK ASSESSMENT DOCD: CPT | Performed by: INTERNAL MEDICINE

## 2020-12-18 PROCEDURE — G8510 SCR DEP NEG, NO PLAN REQD: HCPCS | Performed by: INTERNAL MEDICINE

## 2020-12-18 PROCEDURE — G0008 ADMIN INFLUENZA VIRUS VAC: HCPCS | Performed by: INTERNAL MEDICINE

## 2020-12-18 PROCEDURE — 99214 OFFICE O/P EST MOD 30 MIN: CPT | Performed by: INTERNAL MEDICINE

## 2020-12-18 RX ORDER — AMLODIPINE BESYLATE 10 MG/1
10 TABLET ORAL DAILY
Qty: 90 TABLET | Refills: 1 | Status: SHIPPED
Start: 2020-12-18 | End: 2021-04-26

## 2020-12-18 RX ORDER — LEVOTHYROXINE SODIUM 0.07 MG/1
TABLET ORAL
Qty: 90 TABLET | Refills: 1 | Status: SHIPPED
Start: 2020-12-18 | End: 2021-06-22 | Stop reason: SDUPTHER

## 2020-12-18 RX ORDER — PANTOPRAZOLE SODIUM 40 MG/1
TABLET, DELAYED RELEASE ORAL
Qty: 90 TABLET | Refills: 1 | Status: SHIPPED
Start: 2020-12-18 | End: 2021-06-22 | Stop reason: SDUPTHER

## 2020-12-18 ASSESSMENT — PATIENT HEALTH QUESTIONNAIRE - PHQ9
SUM OF ALL RESPONSES TO PHQ QUESTIONS 1-9: 2
SUM OF ALL RESPONSES TO PHQ QUESTIONS 1-9: 2
2. FEELING DOWN, DEPRESSED OR HOPELESS: 1
SUM OF ALL RESPONSES TO PHQ QUESTIONS 1-9: 2
SUM OF ALL RESPONSES TO PHQ9 QUESTIONS 1 & 2: 2
1. LITTLE INTEREST OR PLEASURE IN DOING THINGS: 1

## 2020-12-18 NOTE — PROGRESS NOTES
.  HPI: Patient is currently following with Dr. Maude Leal the new orthopedic spine surgeon. She does have a procedure on Tuesday. She did have an MRI and I believe she is describing herniated disks. I believe she is having epidural injection under fluoroscopy. I have not received the communication nor the x-ray report. Patient will have a high-dose flu vaccination today. Patient has been evaluated multiple times for shortness of breath. Patient does have some diastolic dysfunction. Her blood pressure here is extremely well controlled. Peripheral lymphedema is well controlled. I have asked her to go off of aspirin and any nonsteroidal until after her surgery. Patient is not on any other anticoagulant. Allergies: NKDA  PMH:  Problem List: Essential hypertension  Health Maintenance:  Influenza Vaccination - (2020)  Mammogram -   Colonoscopy - 2019 diverticular dx  Medical Problems:  Hypothyroidism, Hypertension, Anxiety, Osteoarthritis  Pulmonary HTN - (2018) [facility]-RIGHT SIDED HEART CATH  Incontinence, Peripheral Neuropathy  Diastolic Dysfunction - (6073) CARDIOLOGY/PULMONARY CONSULTS   Sleep Apnea - (10/15/2019) RECENT ADJUSTMENT OF CPAP  Surgical Hx:  Cataract Removal, Multiple urologic surgeries including stim, Removal of Gallbladder, Appendectomy,  Partial Hysterectomy  Reviewed, no changes. FH:  Father:  . (Hx)  due to CAD; Throat Cancer. Mother:  . (Hx)  due to Old age ? unknown. Brother 1:  . (Hx)  due to Pulmonary fibrosis. Brother 2:  . (Hx)  due to MI - age 47. Sister 1:  Coronary Artery Disease (CAD). Maternal Grandmother:  . (Hx)  due to Colon Cancer. Reviewed, no changes. SH:  Marital: Legal Status: . Personal Habits: Cigarette Use: Never Smoked Cigarettes. Alcohol: Social Very Rare. Drug Use:  Denies Drug Use. Reviewed, no changes. Date:   Was the patient queried about smoking behavior?  Yes   Does the patient currently smoke? Smoking: Nonsmoker. Objective  Vitals:    12/18/20 1140   BP: 110/60   Pulse: 65   Resp: 16   Temp: 97.6 °F (36.4 °C)   SpO2: 97%     Exam:  Const: Appears healthy, well developed and well nourished. Appears morbidly obese. Eyes: EOMI in both eyes. PERRL. ENMT: External ears WNL. Tympanic membranes are intact. External nose WNL. Neck: Supple and symmetric. Palpation reveals no adenopathy. No masses appreciated. Thyroid:  no nodule appreciated or thyromegaly. No jugular venous distention. Resp: Respirations are unlabored. Respiration rate is normal. Auscultate good airflow. No rales,  rhonchi or wheezes appreciated over the lungs bilaterally. CV: Rhythm is regular. S1 is normal. S2 is accentuated. S4 is audible. Carotids: no bruits. Abdominal aorta: not  palpable. Pedal pulses: 2+ and equal bilaterally. Extremities: No clubbing, cyanosis or edema. Abdomen: Bowel sounds are normoactive. Palpation reveals softness, with no distension,  organomegaly or tenderness. No abdominal masses palpable. No palpable hepatosplenomegaly. Musculo: Walks with a wide-based gait. Upper Extremities: ROM: Full ROM bilaterally. Lower  Extremities: ROM: Full ROM bilaterally. Skin: Dry and warm with no rash. Neuro: Alert and oriented x3. Mood is normal. Affect is normal. Speech is articulate and fluent. DTR's are symmetric, intact and 2+ bilaterally. Diagnoses and all orders for this visit:    Essential hypertension    Gastroesophageal reflux disease without esophagitis  -     pantoprazole (PROTONIX) 40 MG tablet; TAKE ONE TABLET BY MOUTH DAILY    Acquired hypothyroidism  -     levothyroxine (SYNTHROID) 75 MCG tablet; One po daily  -     TSH without Reflex; Future    Lumbar radiculopathy    Primary insomnia    Chronic fatigue    Mixed hyperlipidemia  -     Lipid Panel; Future  -     Comprehensive Metabolic Panel;  Future    Morbidly obese ([address]    Need for influenza vaccination  -     Cornelius Leon ADJUVANTED, 65 YRS =, IM, PF, PREFILL SYR, 0.5ML (FLUAD)    Other orders  -     amLODIPine (NORVASC) 10 MG tablet; Take 1 tablet by mouth daily    Patient is to be seen back in 6 months. High-dose flu vaccination today. Blood work today. Advise regarding surgical intervention. Patient is advised to get Covid vaccination when it becomes available.

## 2021-01-14 DIAGNOSIS — F41.9 ANXIETY: ICD-10-CM

## 2021-01-14 DIAGNOSIS — F51.01 PRIMARY INSOMNIA: Primary | ICD-10-CM

## 2021-01-14 RX ORDER — LORAZEPAM 0.5 MG/1
0.5 TABLET ORAL EVERY 8 HOURS PRN
Qty: 90 TABLET | Refills: 3 | Status: SHIPPED
Start: 2021-01-14 | End: 2021-08-02

## 2021-01-14 NOTE — TELEPHONE ENCOUNTER
Daughter jarocho calling [facility] states they will need a verbal ok from  to dispense ambien 10 days early ok to give? [facility] also needs a nex rx for ativan , no rx on file.    Daughter jarocho would like a return call

## 2021-01-25 ENCOUNTER — TELEPHONE (OUTPATIENT)
Dept: FAMILY MEDICINE CLINIC | Age: 76
End: 2021-01-25

## 2021-01-25 DIAGNOSIS — R42 DIZZINESS: Primary | ICD-10-CM

## 2021-01-25 NOTE — TELEPHONE ENCOUNTER
Alexi Torres calling about the dizziness that she keeps experiencing? She is asking if you think she should get an MRI, or be referred to a Neurologist for that?

## 2021-02-05 RX ORDER — ROSUVASTATIN CALCIUM 20 MG/1
20 TABLET, COATED ORAL DAILY
Qty: 90 TABLET | Refills: 1 | Status: SHIPPED
Start: 2021-02-05 | End: 2021-06-22 | Stop reason: SDUPTHER

## 2021-02-08 ENCOUNTER — OFFICE VISIT (OUTPATIENT)
Dept: NEUROLOGY | Age: 76
End: 2021-02-08
Payer: MEDICARE

## 2021-02-08 VITALS
TEMPERATURE: 97.1 F | WEIGHT: 232 LBS | DIASTOLIC BLOOD PRESSURE: 50 MMHG | SYSTOLIC BLOOD PRESSURE: 138 MMHG | BODY MASS INDEX: 37.28 KG/M2 | HEIGHT: 66 IN

## 2021-02-08 DIAGNOSIS — I67.2 CEREBRAL ARTERIOSCLEROSIS: ICD-10-CM

## 2021-02-08 DIAGNOSIS — H81.90 VESTIBULOPATHY, UNSPECIFIED LATERALITY: ICD-10-CM

## 2021-02-08 DIAGNOSIS — R42 DIZZINESS AND GIDDINESS: ICD-10-CM

## 2021-02-08 DIAGNOSIS — H93.11 TINNITUS, RIGHT EAR: Primary | ICD-10-CM

## 2021-02-08 DIAGNOSIS — H93.11 TINNITUS OF RIGHT EAR: ICD-10-CM

## 2021-02-08 PROCEDURE — 99204 OFFICE O/P NEW MOD 45 MIN: CPT | Performed by: PSYCHIATRY & NEUROLOGY

## 2021-02-08 ASSESSMENT — ENCOUNTER SYMPTOMS
EYES NEGATIVE: 1
ALLERGIC/IMMUNOLOGIC NEGATIVE: 1
SHORTNESS OF BREATH: 1
GASTROINTESTINAL NEGATIVE: 1

## 2021-02-08 NOTE — TELEPHONE ENCOUNTER
Noted rosuvastatin 20mg rx approved, thank you! Spoke to patient's Rishabh [address] - rec'd rosuvastatin 20mg rx and will get 90-ds ready for patient billed to Ale[facility]gkin, $5 cost to patient. They will cancel the rosuvastatin 40mg tab rx. Also confirms no remaining active rx for losartan on file for patient. Left message advising patient that her rosuvastatin rx has been updated to the 20mg tablet and will be ready at her [facility], so as we discussed she can  and start now to take the one tablet every day, rather than having to split her 40mg tablets.     =======================================================   For Pharmacy Admin Tracking Only    PHSO: Yes  Total # of Interventions Recommended: 1  - Decreased Dose #: 1  - New Order #: 1 New Medication Order Reason(s):  Adherence  - Maintenance Safety Lab Monitoring #: 1  - New Therapy Lab Monitoring #: 1  Recommended intervention potential cost savings: 1  Total Interventions Accepted: 1  Time Spent (min): 20
Diabetic: No      Tobacco smoker: No      Systolic Blood Pressure: 792 mmHg      Is BP treated: Yes      HDL Cholesterol: 65 mg/dL      Total Cholesterol: 143 mg/dL       PLAN  Reached patient to review. States she has been back to taking 1/2 tablet, or rosuvastatin 20mg daily. Would prefer the 20mg tablet so she wouldn't have to split. Uses a pill box. Losartan did not sound familiar to patient as a current medication - she got out labetalol as her \"L\" blood pressure medication. Removed losartan from this EMR's medication list.    Patient thinks she has about the full #30 tab bottle remaining of the rosuvastatin 40mg tablets, but would switch to the 20mg tabs now to not have to split tablets, and states refills would go to her PCP. States VM with PHI ok if/for return call.

## 2021-02-08 NOTE — PROGRESS NOTES
Neurology Consult Note:    Patient: Saba Mcfarland  : 1945  Date: 21  Referring provider: Tracie Moore MD    Referral to Neurology: Intermittent dizziness and lightheadedness since summer 2020. Dear Tracie Moore MD:    Thank you for your referral of Saba Mcfarland to the Neurology clinic, a 66-year-old alert woman referred for history of intermittent dizziness for second opinion Neurology consult. She reports the onset of dizziness and lightheadedness symptoms since the summer 2020 without an apparent precipitant cause. She does complain of intermittent tinnitus of her right ear. She also has chronic right sided posterior neck pain, probably related to cervical spondylosis and DJD which had been previously noted. Review of medical records on file in Epic/media indicates she has been extensively evaluated with Dr. Oswaldo Tyler, [facility] Neurology, for etiology of dizziness, and completed a CTA of the head neck, head CT scan, numerous labs and recommendation to complete an MR brain scan since her bladder stimulator apparently has been removed, as of 2021 note. She reports her MR brain scan was approved and has yet to be scheduled which she will complete at [facility]. She has previously seen Cardiology. She had a past history of PAT and PACs. She has not yet been evaluated by ENT. There is no history of TIA/stroke syndrome such as hemiparesis, hemisensory loss, facial droop, slurred speech, visual loss, diplopia, dysphagia, confusional episode, spinning vertigo, or loss of consciousness. [facility] Neurology, Dr. Oswaldo Tyler consultation notes reviewed, 2021 and 2020. Reviewed orthostatic vital signs, unremarkable. Reviewed CT head images from 8/2020 with subcortical advanced periventricular white matter disease. At that time patient could not have brain MRI due to bladder stimulator in place. Reviewed numerous laboratories including Z70, folic acid, ammonia, TSH, hemoglobin A1c of 5.7 all within normal limits, . Reviewed CTA of head/neck noting mild atheromatous plaque in bilateral common carotid arteries and cervical segments of ICAs, moderate plaque less than 50% identified bilaterally and cavernous segments of ICAs, atheromatous calcification noted distally in the right vertebral artery greater than 80% stenosis, dominant left vertebral artery identified. Greater than 50% stenosis in proximal right subclavian artery, no hemodynamically significant stenosis in proximal left subclavian artery, [place] incomplete, variant of normal secondary to lack of well-developed bilateral posterior communicating arteries. Significant C-spine degenerative changes, spondylosis, also noted. In regards to dizziness complaints, lightheadedness and off-balance sensation, patient symptoms were reported as clinically improved and not position related. Previous sodium level slightly low at 132. Lab Data: Reviewed from 12/18/2020, 8/22/2020, creatinine 1.2, normal lipid panel, normal LFTs, glucose 76, 97, TSH 2.29. Imaging Data:  Carotid ultrasound, 8/21/2020, no hemodynamically significant stenosis, nonvisualization of right vertebral artery, antegrade vertebral artery flow in left vertebral artery. CTA of head/neck, 8/22/2020:    Holter monitor results, [facility], for/11/17, isolated PACs, 2 brief bursts of PAT, no A. fib, bradycardia, heart block or pauses.     Current Outpatient Medications   Medication Sig Dispense Refill    rosuvastatin (CRESTOR) 20 MG tablet Take 1 tablet by mouth daily 90 tablet 1  LORazepam (ATIVAN) 0.5 MG tablet Take 1 tablet by mouth every 8 hours as needed for Anxiety for up to 30 days. 90 tablet 3    pantoprazole (PROTONIX) 40 MG tablet TAKE ONE TABLET BY MOUTH DAILY 90 tablet 1    levothyroxine (SYNTHROID) 75 MCG tablet One po daily 90 tablet 1    amLODIPine (NORVASC) 10 MG tablet Take 1 tablet by mouth daily 90 tablet 1    DULoxetine (CYMBALTA) 60 MG extended release capsule TAKE ONE CAPSULE BY MOUTH EVERY DAY 90 capsule 1    furosemide (LASIX) 20 MG tablet TAKE TWO TABLETS BY MOUTH DAILY 60 tablet 5    labetalol (NORMODYNE) 100 MG tablet TAKE ONE TABLET BY MOUTH TWO TIMES A DAY 60 tablet 5    fluticasone (FLONASE) 50 MCG/ACT nasal spray 2 sprays by Each Nostril route daily 1 Bottle 5    Multiple Vitamin (MVI, CELEBRATE, CHEWABLE TABLET) Take one(1) tablet daily.  vitamin C (ASCORBIC ACID) 500 MG tablet Take by mouth      potassium chloride (KLOR-CON) 20 MEQ packet Take 40 mEq by mouth daily Only when taking water pills       No current facility-administered medications for this visit.         Allergies   Allergen Reactions    Fesoterodine Rash       Patient Active Problem List   Diagnosis    Sleep apnea    Essential hypertension    Chronic fatigue    Primary insomnia    Lumbar radiculopathy    Gastroesophageal reflux disease without esophagitis    Hypothyroidism    Anxiety    Morbidly obese (HCC)    Dizziness and giddiness    Tinnitus of right ear    Vestibulopathy       Past Medical History:   Diagnosis Date    Anxiety     Arthritis     Diastolic dysfunction 72/87/9321    CARDIOLOGY/PULMONARY CONSULTS, 7/2018    Dizziness and giddiness 2/8/2021    Hypertension     Hypothyroidism     Neuropathy     PERIPHERAL    Osteoarthritis     Sleep apnea     RECENT ADJUSTMENT OF CPAP    Tinnitus of right ear 2/8/2021    Urinary incontinence     Vestibulopathy 2/8/2021       Past Surgical History:   Procedure Laterality Date    APPENDECTOMY  BLADDER SURGERY      CARDIAC CATHETERIZATION Right 05/2018    CATARACT REMOVAL      CHOLECYSTECTOMY      HYSTERECTOMY      OTHER SURGICAL HISTORY      multiple urologic surgeries including stim       Family History   Problem Relation Age of Onset    No Known Problems Mother     Cancer Father         larynx    Heart Disease Father     Coronary Art Dis Father     Other Brother         pulmonary fibrosis    Heart Attack Brother     Coronary Art Dis Sister     Colon Cancer Maternal Grandmother        Social History     Socioeconomic History    Marital status:       Spouse name: Not on file    Number of children: Not on file    Years of education: Not on file    Highest education level: Not on file   Occupational History    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    Social Needs    Financial resource strain: Not on file    Food insecurity     Worry: Not on file     Inability: Not on file   [employer] needs     Medical: Not on file     Non-medical: Not on file   Tobacco Use    Smoking status: Never Smoker    Smokeless tobacco: Never Used   Substance and Sexual Activity    Alcohol use: Yes     Comment: Socially    Drug use: No    Sexual activity: Not on file   Lifestyle    Physical activity     Days per week: Not on file     Minutes per session: Not on file    Stress: Not on file   Relationships    Social connections     Talks on phone: Not on file     Gets together: Not on file     Attends [employer] service: Not on file     Active member of club or organization: Not on file     Attends meetings of clubs or organizations: Not on file     Relationship status: Not on file    Intimate partner violence     Fear of current or ex partner: Not on file     Emotionally abused: Not on file     Physically abused: Not on file     Forced sexual activity: Not on file   Other Topics Concern    Not on file   Social History Narrative    Not on file     Review of Systems Constitutional: Negative. HENT: Positive for tinnitus. tinnitus of right ear reported   Eyes: Negative. Respiratory: Positive for shortness of breath. Cardiovascular: Hx PAT, PAC's   Gastrointestinal: Negative. Endocrine: Negative. Genitourinary: Negative. Musculoskeletal: Positive for gait problem. Skin: Negative. Allergic/Immunologic: Negative. Neurological: Positive for dizziness and light-headedness. Hematological: Negative. Psychiatric/Behavioral: The patient is nervous/anxious. All other systems reviewed and are negative. Neurologic Exam:  BP (!) 138/50 (Site: Right Upper Arm, Position: Sitting, Cuff Size: Medium Adult)   Temp 97.1 °F (36.2 °C)   Ht 5' 6\" (1.676 m)   Wt 232 lb (105.2 kg)   BMI 37.45 kg/m²   General appearance: Alert, anxious, cooperative, morbidly obese, well-nourished, well-groomed, seated in the exam room, no acute distress. HEENT: Normocephalic/atraumatic. Neck: Supple, no bruits heard  Cardiac: RRR  Respiratory: grossly clear  Extremities: Morbidly obese, without edema, erythema or cyanosis  Skin: No apparent lesions or rashes  Musculoskeletal: No fasciculations or tremors, no foot drop, negative bilateral straight leg raising test.  Mental Status: Alert, oriented x3  Speech/Language: Clear, grossly fluent  Attention span/Concentration: Grossly intact  Affect/Mood: Anxious  Insight/Judgement: Fairly good     Fund of Knowledge/Current events: Grossly intact  CN II-XII:     Pupils: Equal, reactive to light, 2.5 mm     EOM's: Full without nystagmus  Visual Fields: Full to confrontation  Fundi: Miosis to light  CN V: normal V1-V3  CN VII: Facial droop  CN VIII: Grossly intact  CN IX-XII: Tongue midline  SCM/Trapezii: 5/5 power  Motor: 5/5 power in the upper and lower extremities, somewhat effort related without tremor or drift and normal motor tone. Intact fine motor function of both hands, symmetric. DTR's: 1+ and symmetric in the upper extremities, absent patellar and ankle jerks but may be related to morbidly obese limbs and difficulty with positioning and relaxing for DTR testing. Sensory: Reports grossly intact subjective sensation throughout. Coordination/Gait: No gross limb dysmetria on finger-to-nose testing, no truncal or cerebellar gait ataxia. Mechanical gait dysfunction related to morbid obesity and DJD of knees. Assessment/Plan:  1. Probable peripheral vestibulopathy, unspecified etiology, with history of intermittent dizziness and lightheadedness symptoms since summer 2020 but with history of tinnitus of the right ear. 2.  She will be completing her MR brain scan as previously ordered by Dr. Silvia Hartmann, [facility], at [facility]. 3.  Cerebral arterial sclerosis, subcortical periventricular region on head CT, likely related to medical/vascular risk factors of hypertension, dyslipidemia and age-related process. 4.  Medical management of her chronic medical conditions and vascular risk factors are otherwise advisable for secondary stroke risk reduction. If there are no absolute medical contraindications, then low-dose aspirin 81 mg daily would be advisable. 5.  She should also be evaluated by ENT given her history of tinnitus and intermittent or episodic dizziness and lightheadedness complaints. 6.  She was provided patient information on dizziness, balance disorders, and cerebral arterial sclerosis. Sincerely,      Nehemias Burger MD    This note was created using speech recognition transcription software. Despite proofreading, there may be several typographical errors present that may affect the meaning of the content. Please call with any questions. Note: More than 50% of this 40-minute face-face visit time included counseling and coordination of care based on clinical impression, review of test results, treatment plan, risk factor reduction and patient and/or family education.

## 2021-03-22 ENCOUNTER — TELEPHONE (OUTPATIENT)
Dept: PHARMACY | Facility: CLINIC | Age: 76
End: 2021-03-22

## 2021-03-22 NOTE — TELEPHONE ENCOUNTER
[facility] REVIEW: ADHERENCE REVIEW  Identified care gap per [employer]; fills at [facility]: Statin adherence    Last Visit: 12/18/20    Patient found in Outcomes MTM and is not currently eligible for CMR/TIP    ASSESSMENT  STATIN ADHERENCE    Per Insurance Records through 3/6/21 (2020 Zeb Rivero = 71%; YTD Zeb Rivero = filled only once):   Rosuvastatin last filled on 1/19/21 for 30 day supply. Next refill due: 2/18/21    Per chart review: clinical pharmacist spoke w/ pt on 2/5/21 and pt was taking rosuvastatin ONE-HALF tablet (20 mg) daily. Pharmacist obtained updated prescription and refill was processed. Unsure why 2/8/21 refill is not showing on [employer] report. Per Outcomes MT Records:  Rosuvastatin 20 mg last filled on 2/8/21 for 90 day supply. Per [facility]:   Rosuvastatin filled on 2/8/21 was returned to stock. Lab Results   Component Value Date    CHOL 143 12/18/2020    TRIG 100 12/18/2020    HDL 65 12/18/2020    LDLCHOLESTEROL 132 (H) 08/21/2020    LDLCALC 58 12/18/2020     ALT   Date Value Ref Range Status   12/18/2020 11 0 - 32 U/L Final     AST   Date Value Ref Range Status   12/18/2020 22 0 - 31 U/L Final     The 10-year ASCVD risk score (Keith Boateng, et al., 2013) is: 29.5%    Values used to calculate the score:      Age: 66 years      Sex: Female      Is Non- : No      Diabetic: No      Tobacco smoker: No      Systolic Blood Pressure: 529 mmHg      Is BP treated: Yes      HDL Cholesterol: 65 mg/dL      Total Cholesterol: 143 mg/dL     PLAN  The following are interventions that have been identified:   - Patient overdue refilling rosuvastatin and active on home medication list.     Reached patient to review. Patient confirms she is taking rosuvastatin 20 mg daily and \"thought you were going to get a new prescription for me so I don't have to cut my tablet in half\".  Explained to the patient that a new prescription was processed, but it was returned to stock bc she didn't pick it up. Patient was unaware that a prescription was ready for her. States that she has to go to the pharmacy today to  another medication and requests writer to call the pharmacy and process rosuvastatin refill. Called and spoke with pharmacy staff - will process rosuvastatin 20 mg refill today. Will sign off.      Future Appointments   Date Time Provider Lorri Sequeira   6/22/2021  1:00 PM Minerva Noel MD [address] [place] [address]. [address] Naima Donna Oksana Levin, PharmD, [place]  Direct: [phone]  Department, toll free [phone], option [phone]          For Pharmacy Admin Tracking Only    PHSO: Yes  Total # of Interventions Recommended: 1  Recommended intervention potential cost savings: 1  Total Interventions Accepted: 1  Time Spent (min): 15

## 2021-04-16 NOTE — TELEPHONE ENCOUNTER
Per Rosamaria Santos at [address]: rosuvastatin 20 mg was picked up. No other information provided to writer. Will sign off at this time.      Kenya Gaspar, PharmD, [facility]  Direct: [phone]  Department, toll free [phone], option 7

## 2021-04-23 DIAGNOSIS — R05.9 COUGH: ICD-10-CM

## 2021-04-26 RX ORDER — DULOXETIN HYDROCHLORIDE 60 MG/1
CAPSULE, DELAYED RELEASE ORAL
Qty: 90 CAPSULE | Refills: 0 | Status: SHIPPED
Start: 2021-04-26 | End: 2021-06-22 | Stop reason: SDUPTHER

## 2021-04-26 RX ORDER — AMLODIPINE BESYLATE 10 MG/1
TABLET ORAL
Qty: 90 TABLET | Refills: 0 | Status: SHIPPED
Start: 2021-04-26 | End: 2021-06-22 | Stop reason: SDUPTHER

## 2021-04-26 RX ORDER — LABETALOL 100 MG/1
TABLET, FILM COATED ORAL
Qty: 60 TABLET | Refills: 0 | Status: SHIPPED
Start: 2021-04-26 | End: 2021-06-01

## 2021-04-26 RX ORDER — GUAIFENESIN AND CODEINE PHOSPHATE 10; 100 MG/5ML; MG/5ML
LIQUID ORAL
Qty: 600 ML | Refills: 0 | Status: SHIPPED
Start: 2021-04-26 | End: 2021-09-21

## 2021-04-26 RX ORDER — FLUTICASONE PROPIONATE 50 MCG
SPRAY, SUSPENSION (ML) NASAL
Qty: 16 G | Refills: 0 | Status: SHIPPED
Start: 2021-04-26 | End: 2021-08-09

## 2021-04-26 NOTE — TELEPHONE ENCOUNTER
Last Appointment:  12/18/2020  Future Appointments   Date Time Provider Lorri Sequeira   6/22/2021  1:00 PM Ike Bowser  [facility]

## 2021-06-01 RX ORDER — LABETALOL 100 MG/1
TABLET, FILM COATED ORAL
Qty: 60 TABLET | Refills: 0 | Status: SHIPPED
Start: 2021-06-01 | End: 2021-06-22 | Stop reason: SDUPTHER

## 2021-06-01 NOTE — TELEPHONE ENCOUNTER
Last Appointment:  12/18/2020  Future Appointments   Date Time Provider Lorri Sequeira   6/22/2021  1:00 PM Chantal Russell  W [address] [place] [address]

## 2021-06-22 ENCOUNTER — OFFICE VISIT (OUTPATIENT)
Dept: FAMILY MEDICINE CLINIC | Age: 76
End: 2021-06-22
Payer: MEDICARE

## 2021-06-22 VITALS
TEMPERATURE: 98.1 F | SYSTOLIC BLOOD PRESSURE: 140 MMHG | WEIGHT: 227 LBS | OXYGEN SATURATION: 98 % | BODY MASS INDEX: 36.64 KG/M2 | HEART RATE: 62 BPM | DIASTOLIC BLOOD PRESSURE: 80 MMHG

## 2021-06-22 DIAGNOSIS — F41.9 ANXIETY: ICD-10-CM

## 2021-06-22 DIAGNOSIS — E66.01 MORBIDLY OBESE (HCC): ICD-10-CM

## 2021-06-22 DIAGNOSIS — R42 DIZZINESS: ICD-10-CM

## 2021-06-22 DIAGNOSIS — I10 ESSENTIAL HYPERTENSION: Primary | ICD-10-CM

## 2021-06-22 DIAGNOSIS — K21.9 GASTROESOPHAGEAL REFLUX DISEASE WITHOUT ESOPHAGITIS: ICD-10-CM

## 2021-06-22 DIAGNOSIS — E03.9 ACQUIRED HYPOTHYROIDISM: ICD-10-CM

## 2021-06-22 DIAGNOSIS — F51.01 PRIMARY INSOMNIA: ICD-10-CM

## 2021-06-22 DIAGNOSIS — I10 ESSENTIAL HYPERTENSION: ICD-10-CM

## 2021-06-22 LAB
ALBUMIN SERPL-MCNC: 4.4 G/DL (ref 3.5–5.2)
ALP BLD-CCNC: 77 U/L (ref 35–104)
ALT SERPL-CCNC: 8 U/L (ref 0–32)
ANION GAP SERPL CALCULATED.3IONS-SCNC: 17 MMOL/L (ref 7–16)
AST SERPL-CCNC: 25 U/L (ref 0–31)
BILIRUB SERPL-MCNC: 0.4 MG/DL (ref 0–1.2)
BUN BLDV-MCNC: 12 MG/DL (ref 6–23)
CALCIUM SERPL-MCNC: 10.1 MG/DL (ref 8.6–10.2)
CHLORIDE BLD-SCNC: 100 MMOL/L (ref 98–107)
CHOLESTEROL, TOTAL: 128 MG/DL (ref 0–199)
CO2: 20 MMOL/L (ref 22–29)
CREAT SERPL-MCNC: 1.1 MG/DL (ref 0.5–1)
GFR AFRICAN AMERICAN: 58
GFR NON-AFRICAN AMERICAN: 48 ML/MIN/1.73
GLUCOSE BLD-MCNC: 97 MG/DL (ref 74–99)
HDLC SERPL-MCNC: 62 MG/DL
LDL CHOLESTEROL CALCULATED: 53 MG/DL (ref 0–99)
POTASSIUM SERPL-SCNC: 4.5 MMOL/L (ref 3.5–5)
SODIUM BLD-SCNC: 137 MMOL/L (ref 132–146)
TOTAL PROTEIN: 7.8 G/DL (ref 6.4–8.3)
TRIGL SERPL-MCNC: 64 MG/DL (ref 0–149)
TSH SERPL DL<=0.05 MIU/L-ACNC: 3.07 UIU/ML (ref 0.27–4.2)
VLDLC SERPL CALC-MCNC: 13 MG/DL

## 2021-06-22 PROCEDURE — 99214 OFFICE O/P EST MOD 30 MIN: CPT | Performed by: INTERNAL MEDICINE

## 2021-06-22 RX ORDER — LABETALOL 100 MG/1
TABLET, FILM COATED ORAL
Qty: 180 TABLET | Refills: 1 | Status: SHIPPED
Start: 2021-06-22 | End: 2021-12-22 | Stop reason: SDUPTHER

## 2021-06-22 RX ORDER — PANTOPRAZOLE SODIUM 40 MG/1
TABLET, DELAYED RELEASE ORAL
Qty: 90 TABLET | Refills: 1 | Status: SHIPPED
Start: 2021-06-22 | End: 2021-12-22 | Stop reason: SDUPTHER

## 2021-06-22 RX ORDER — AMLODIPINE BESYLATE 10 MG/1
TABLET ORAL
Qty: 90 TABLET | Refills: 1 | Status: SHIPPED
Start: 2021-06-22 | End: 2021-12-22 | Stop reason: SDUPTHER

## 2021-06-22 RX ORDER — DULOXETIN HYDROCHLORIDE 60 MG/1
CAPSULE, DELAYED RELEASE ORAL
Qty: 90 CAPSULE | Refills: 1 | Status: SHIPPED
Start: 2021-06-22 | End: 2021-12-22 | Stop reason: SDUPTHER

## 2021-06-22 RX ORDER — CLOTRIMAZOLE 10 MG/1
10 LOZENGE ORAL; TOPICAL
Qty: 50 TABLET | Refills: 0 | Status: SHIPPED | OUTPATIENT
Start: 2021-06-22 | End: 2021-07-02

## 2021-06-22 RX ORDER — ROSUVASTATIN CALCIUM 20 MG/1
20 TABLET, COATED ORAL DAILY
Qty: 90 TABLET | Refills: 1 | Status: SHIPPED
Start: 2021-06-22 | End: 2021-12-22 | Stop reason: SDUPTHER

## 2021-06-22 RX ORDER — LEVOTHYROXINE SODIUM 0.07 MG/1
TABLET ORAL
Qty: 90 TABLET | Refills: 1 | Status: SHIPPED
Start: 2021-06-22 | End: 2021-12-22 | Stop reason: SDUPTHER

## 2021-06-22 ASSESSMENT — PATIENT HEALTH QUESTIONNAIRE - PHQ9
SUM OF ALL RESPONSES TO PHQ QUESTIONS 1-9: 2
1. LITTLE INTEREST OR PLEASURE IN DOING THINGS: 1
SUM OF ALL RESPONSES TO PHQ9 QUESTIONS 1 & 2: 2
SUM OF ALL RESPONSES TO PHQ QUESTIONS 1-9: 2
SUM OF ALL RESPONSES TO PHQ QUESTIONS 1-9: 2
2. FEELING DOWN, DEPRESSED OR HOPELESS: 1

## 2021-06-22 NOTE — PROGRESS NOTES
.  HPI: Patient has series of 3 epidurals for lumbar pain. The third 1 did help and she has had sustained improvement. The patient has been evaluated by 2 neurologists for continued dizziness. She also had benign paroxysmal positional vertigo evaluation. All of this was not helpful and she continues to have symptoms of vertigo. This is intermittent and not consistent. Patient denies cardiac or respiratory symptoms currently. She does have a bit of a sore throat. Patient has recently been evaluated by  urology. I did discuss with her her options but she really just did not understand and I have asked her to call the urologist back to clarify her options in terms of treatment. Clinically she is euthyroid. Allergies: NKDA  PMH:  Problem List: Essential hypertension  Health Maintenance:  Influenza Vaccination - (2020)  Mammogram -   Colonoscopy - 2019 diverticular dx  Medical Problems:  Hypothyroidism, Hypertension, Anxiety, Osteoarthritis  Pulmonary HTN - (2018) [facility]-RIGHT SIDED HEART CATH  Incontinence, Peripheral Neuropathy  Diastolic Dysfunction - (67) CARDIOLOGY/PULMONARY CONSULTS   Sleep Apnea - (10/15/2019) RECENT ADJUSTMENT OF CPAP  Dizziness- Paul/ Rosemary   Surgical Hx:  Cataract Removal, Multiple urologic surgeries including stim, Removal of Gallbladder, Appendectomy,  Partial Hysterectomy  Reviewed, no changes. FH:  Father:  . (Hx)  due to CAD; Throat Cancer. Mother:  . (Hx)  due to Old age ? unknown. Brother 1:  . (Hx)  due to Pulmonary fibrosis. Brother 2:  . (Hx)  due to MI - age 47. Sister 1:  Coronary Artery Disease (CAD). Maternal Grandmother:  . (Hx)  due to Colon Cancer. Reviewed, no changes. SH:  Marital: Legal Status: . Personal Habits: Cigarette Use: Never Smoked Cigarettes. Alcohol: Social Very Rare. Drug Use:  Denies Drug Use. Reviewed, no changes.   Date: 2021  Was the patient queried about smoking behavior? Yes   Does the patient currently smoke? Smoking: Nonsmoker. Objective  Vitals:    06/22/21 1308   BP: (!) 140/80   Pulse: 62   Temp: 98.1 °F (36.7 °C)   SpO2: 98%   Exam:  Const: Appears healthy, well developed and well nourished. Appears morbidly obese. Eyes: EOMI in both eyes. PERRL. ENMT: External ears WNL. Tympanic membranes are intact. External nose WNL. Neck: Supple and symmetric. Palpation reveals no adenopathy. No masses appreciated. Thyroid:  no nodule appreciated or thyromegaly. No jugular venous distention. Resp: Respirations are unlabored. Respiration rate is normal. Auscultate good airflow. No rales,  rhonchi or wheezes appreciated over the lungs bilaterally. CV: Rhythm is regular. S1 is normal. S2 is accentuated. S4 is audible. Carotids: no bruits. Abdominal aorta: not  palpable. Pedal pulses: 2+ and equal bilaterally. Extremities: No clubbing, cyanosis or edema. Abdomen: Bowel sounds are normoactive. Palpation reveals softness, with no distension,  organomegaly or tenderness. No abdominal masses palpable. No palpable hepatosplenomegaly. Musculo: Walks with a wide-based gait. Upper Extremities: ROM: Full ROM bilaterally. Lower  Extremities: ROM: Full ROM bilaterally. Skin: Dry and warm with no rash. Neuro: Alert and oriented x3. Mood is normal. Affect is normal. Speech is articulate and fluent. DTR's are symmetric, intact and 2+ bilaterally. Edgar Cortez was seen today for 6 month follow-up and other. Diagnoses and all orders for this visit:    Essential hypertension  -     Comprehensive Metabolic Panel; Future  -     Lipid Panel; Future    Gastroesophageal reflux disease without esophagitis  -     pantoprazole (PROTONIX) 40 MG tablet; TAKE ONE TABLET BY MOUTH DAILY    Acquired hypothyroidism  -     levothyroxine (SYNTHROID) 75 MCG tablet; One po daily  -     TSH without Reflex;  Future    Morbidly obese ([address]    Primary insomnia    Anxiety    Dizziness  -     [address], Scottie Parker., DO, Ear, Nose, Throat, [place]    Other orders  -     rosuvastatin (CRESTOR) 20 MG tablet; Take 1 tablet by mouth daily  -     amLODIPine (NORVASC) 10 MG tablet; TAKE ONE TABLET BY MOUTH DAILY  -     DULoxetine (CYMBALTA) 60 MG extended release capsule; TAKE ONE CAPSULE BY MOUTH EVERY DAY  -     labetalol (NORMODYNE) 100 MG tablet; TAKE ONE TABLET BY MOUTH TWO TIMES A DAY  -     clotrimazole (MYCELEX) 10 MG ritika; Take 1 tablet by mouth 5 times daily for 10 days    I did review Dr. Maryann Abreu and Dr. Adelaide Spears note. Dr. Irving Pettit did indicate possible ENT referral which will be made because of continued symptoms. Patient is to have lab work. Patient is given Mycelex atrocious for what appears to be minimal oral candidiasis.

## 2021-08-01 DIAGNOSIS — F41.9 ANXIETY: ICD-10-CM

## 2021-08-02 ENCOUNTER — TELEPHONE (OUTPATIENT)
Dept: ENT CLINIC | Age: 76
End: 2021-08-02

## 2021-08-02 RX ORDER — LORAZEPAM 0.5 MG/1
TABLET ORAL
Qty: 90 TABLET | Refills: 0 | Status: SHIPPED
Start: 2021-08-02 | End: 2021-09-21

## 2021-08-02 NOTE — TELEPHONE ENCOUNTER
Last Appointment:  6/22/2021  Future Appointments   Date Time Provider Lorri Sequeira   8/30/2021  9:15 [facility]   12/22/2021  1:00 PM Katt Miller  [address]

## 2021-08-02 NOTE — TELEPHONE ENCOUNTER
New/ref by Dr Castaneda Few onset for about 3+ months. Patient requesting to be moved up sooner, having trouble with balance progressively getting worse. MRI head done [place].  Please advise [phone] or [phone]

## 2021-08-09 ENCOUNTER — PROCEDURE VISIT (OUTPATIENT)
Dept: AUDIOLOGY | Age: 76
End: 2021-08-09
Payer: MEDICARE

## 2021-08-09 ENCOUNTER — OFFICE VISIT (OUTPATIENT)
Dept: ENT CLINIC | Age: 76
End: 2021-08-09
Payer: MEDICARE

## 2021-08-09 VITALS
SYSTOLIC BLOOD PRESSURE: 144 MMHG | DIASTOLIC BLOOD PRESSURE: 72 MMHG | HEART RATE: 72 BPM | BODY MASS INDEX: 35.51 KG/M2 | WEIGHT: 220 LBS

## 2021-08-09 DIAGNOSIS — H90.A32 MIXED CONDUCTIVE AND SENSORINEURAL HEARING LOSS OF LEFT EAR WITH RESTRICTED HEARING OF RIGHT EAR: ICD-10-CM

## 2021-08-09 DIAGNOSIS — R42 DIZZINESS: Primary | ICD-10-CM

## 2021-08-09 DIAGNOSIS — R42 DISEQUILIBRIUM: Primary | ICD-10-CM

## 2021-08-09 PROCEDURE — 92567 TYMPANOMETRY: CPT | Performed by: AUDIOLOGIST

## 2021-08-09 PROCEDURE — 92557 COMPREHENSIVE HEARING TEST: CPT | Performed by: AUDIOLOGIST

## 2021-08-09 PROCEDURE — 99203 OFFICE O/P NEW LOW 30 MIN: CPT | Performed by: OTOLARYNGOLOGY

## 2021-08-09 RX ORDER — FLUTICASONE PROPIONATE 50 MCG
SPRAY, SUSPENSION (ML) NASAL
Qty: 16 G | Refills: 0 | Status: SHIPPED
Start: 2021-08-09 | End: 2022-01-03 | Stop reason: SDUPTHER

## 2021-08-09 RX ORDER — CLOTRIMAZOLE 10 MG/1
10 LOZENGE ORAL; TOPICAL
COMMUNITY

## 2021-08-09 ASSESSMENT — ENCOUNTER SYMPTOMS
VOMITING: 0
COUGH: 0
SHORTNESS OF BREATH: 0

## 2021-08-09 NOTE — PROGRESS NOTES
[id number] [facility] Otolaryngology  Dr. Anderson Berger. IGLESIA Macias Ms.Ed. New Consult       Patient Name:  Ronel Cast  :  1945     CHIEF C/O:    Chief Complaint   Patient presents with    Dizziness     new dizziness for 3+ months getting worse, MRI of brain on disc in hand       HISTORY OBTAINED FROM:  patient    HISTORY OF PRESENT ILLNESS:       Keily Almonte is a 66y.o. year old female, here today for dizziness; patient denies any significant room spinning vertigo, has disequilibrium with an MRI was recent, showing no significant pathology from otolaryngology standpoint no current complaints of new hoarseness vision changes difficulty swallowing shortness of breath or stridor. No complaints of new hearing loss.       Past Medical History:   Diagnosis Date    Anxiety     Arthritis     Diastolic dysfunction     CARDIOLOGY/PULMONARY CONSULTS, 2018    Dizziness and giddiness 2021    Hypertension     Hypothyroidism     Neuropathy     PERIPHERAL    Osteoarthritis     Sleep apnea     RECENT ADJUSTMENT OF CPAP    Tinnitus of right ear 2021    Urinary incontinence     Vestibulopathy 2021     Past Surgical History:   Procedure Laterality Date    APPENDECTOMY      BLADDER SURGERY      CARDIAC CATHETERIZATION Right 2018    CATARACT REMOVAL      CHOLECYSTECTOMY      HYSTERECTOMY      OTHER SURGICAL HISTORY      multiple urologic surgeries including stim       Current Outpatient Medications:     clotrimazole (MYCELEX) 10 MG ritika, Take 10 mg by mouth 5 times daily, Disp: , Rfl:     LORazepam (ATIVAN) 0.5 MG tablet, TAKE ONE TABLET BY MOUTH EVERY 8 HOURS AS NEEDED FOR ANXIETY FOR UP TO 30 DAYS, Disp: 90 tablet, Rfl: 0    rosuvastatin (CRESTOR) 20 MG tablet, Take 1 tablet by mouth daily, Disp: 90 tablet, Rfl: 1    pantoprazole (PROTONIX) 40 MG tablet, TAKE ONE TABLET BY MOUTH DAILY, Disp: 90 tablet, Rfl: 1    levothyroxine (SYNTHROID) 75 MCG tablet, One po daily, Disp: 90 tablet, Rfl: 1    amLODIPine (NORVASC) 10 MG tablet, TAKE ONE TABLET BY MOUTH DAILY, Disp: 90 tablet, Rfl: 1    DULoxetine (CYMBALTA) 60 MG extended release capsule, TAKE ONE CAPSULE BY MOUTH EVERY DAY, Disp: 90 capsule, Rfl: 1    labetalol (NORMODYNE) 100 MG tablet, TAKE ONE TABLET BY MOUTH TWO TIMES A DAY, Disp: 180 tablet, Rfl: 1    fluticasone (FLONASE) 50 MCG/ACT nasal spray, INSTILL TWO SPRAYS INTO EACH NOSTRIL DAILY, Disp: 16 g, Rfl: 0    furosemide (LASIX) 20 MG tablet, TAKE TWO TABLETS BY MOUTH DAILY, Disp: 60 tablet, Rfl: 5    Multiple Vitamin (MVI, CELEBRATE, CHEWABLE TABLET), Take one(1) tablet daily. , Disp: , Rfl:     vitamin C (ASCORBIC ACID) 500 MG tablet, Take by mouth, Disp: , Rfl:     potassium chloride (KLOR-CON) 20 MEQ packet, Take 40 mEq by mouth daily Only when taking water pills, Disp: , Rfl:   Fesoterodine  Social History     Tobacco Use    Smoking status: Never Smoker    Smokeless tobacco: Never Used   Substance Use Topics    Alcohol use: Yes     Comment: Socially    Drug use: No     Family History   Problem Relation Age of Onset    No Known Problems Mother     Cancer Father         larynx    Heart Disease Father     Coronary Art Dis Father     Other Brother         pulmonary fibrosis    Heart Attack Brother     Coronary Art Dis Sister     Colon Cancer Maternal Grandmother        Review of Systems   Constitutional: Negative for chills and fever. HENT: Negative for ear discharge and hearing loss. Respiratory: Negative for cough and shortness of breath. Cardiovascular: Negative for chest pain and palpitations. Gastrointestinal: Negative for vomiting. Skin: Negative for rash. Allergic/Immunologic: Negative for environmental allergies. Neurological: Negative for dizziness and headaches. Hematological: Does not bruise/bleed easily. All other systems reviewed and are negative.       BP (!) 144/72   Pulse 72   Wt 220 lb (99.8 kg)   BMI 35.51 kg/m²   Physical Exam  Vitals and nursing note reviewed. Constitutional:       Appearance: She is well-developed. HENT:      Head: Normocephalic and atraumatic. No contusion or laceration. Jaw: No trismus or tenderness. Salivary Glands: Right salivary gland is not diffusely enlarged. Right Ear: Tympanic membrane, ear canal and external ear normal. Decreased hearing noted. No drainage or tenderness. No middle ear effusion. Left Ear: Tympanic membrane, ear canal and external ear normal. Decreased hearing noted. No drainage or tenderness. No middle ear effusion. Nose: No septal deviation, mucosal edema, congestion or rhinorrhea. Right Turbinates: Not enlarged. Left Turbinates: Not enlarged. Right Sinus: No frontal sinus tenderness. Left Sinus: No frontal sinus tenderness. Mouth/Throat:      Mouth: Mucous membranes are moist. No lacerations or oral lesions. Dentition: No gum lesions. Tongue: No lesions. Palate: No mass and lesions. Pharynx: No pharyngeal swelling or oropharyngeal exudate. Tonsils: No tonsillar exudate or tonsillar abscesses. Eyes:      Pupils: Pupils are equal, round, and reactive to light. Neck:      Thyroid: No thyromegaly. Trachea: No tracheal deviation. Cardiovascular:      Rate and Rhythm: Normal rate. Pulmonary:      Effort: Pulmonary effort is normal. No respiratory distress. Musculoskeletal:         General: No tenderness. Normal range of motion. Cervical back: Normal range of motion and neck supple. Skin:     General: Skin is warm and dry. Neurological:      Mental Status: She is alert. Cranial Nerves: No cranial nerve deficit.          IMPRESSION/PLAN:  She with ongoing disequilibrium, with no significant source, recommendation with patient undergo vestibular therapy, and follow-up in 1 year for repeat audiogram or sooner with any increase in episodes or concern for new onset of room spinning vertigo that may be more related to inner ear pathology. Dr. Kalen Farooq Otolaryngology/Facial Plastic Surgery Residency  Associate Clinical Professor:  Carlos Del Toro, [facility]

## 2021-08-09 NOTE — PROGRESS NOTES
This patient was referred for audiometric/tympanometric testing by Dr. Rebeca Shah due to dizziness. Audiometry revealed a borderline normal-to-mild sloping to a moderate sensorineural hearing loss in the right ear, and a severe  mixed hearing loss, in the left ear. Reliability was good. Speech reception thresholds were in good agreement with the pure tone averages, bilaterally. Speech discrimination scores were excellent, bilaterally. Asymmetrical results obtained: Left ear worse      Tympanometry revealed normal middle ear peak pressure and compliance, bilaterally. The results were reviewed with the patient. Recommendations for follow up will be made pending physician consult.     Hugh Greenwood

## 2021-08-09 NOTE — TELEPHONE ENCOUNTER
Last Appointment:  6/22/2021  Future Appointments   Date Time Provider Lorri Sequeira   8/9/2021 10:30 AM SCHEDULE, Derrick Nash [facility]   8/9/2021 10:45 AM DO Marcella Mukherjee [facility]   12/22/2021  1:00 PM Lester Beltran  [address]

## 2021-08-23 RX ORDER — FUROSEMIDE 20 MG/1
TABLET ORAL
Qty: 60 TABLET | Refills: 5 | Status: ON HOLD
Start: 2021-08-23 | End: 2022-02-02 | Stop reason: HOSPADM

## 2021-08-23 NOTE — TELEPHONE ENCOUNTER
Last Appointment:  6/22/2021  Future Appointments   Date Time Provider Lorri Fabby   12/22/2021  1:00 PM Monte Dee, MD Mayra Scheuermann [facility]   8/15/2022 10:30 AM DO Marcella Griffiths [facility]

## 2021-09-19 DIAGNOSIS — F41.9 ANXIETY: ICD-10-CM

## 2021-09-19 DIAGNOSIS — F51.01 PRIMARY INSOMNIA: ICD-10-CM

## 2021-09-19 DIAGNOSIS — R05.9 COUGH: ICD-10-CM

## 2021-09-21 RX ORDER — GUAIFENESIN AND CODEINE PHOSPHATE 100; 10 MG/5ML; MG/5ML
5 SOLUTION ORAL 3 TIMES DAILY PRN
Qty: 600 ML | Refills: 0 | Status: SHIPPED
Start: 2021-09-21 | End: 2022-04-07 | Stop reason: SDUPTHER

## 2021-09-21 RX ORDER — LORAZEPAM 0.5 MG/1
0.5 TABLET ORAL EVERY 8 HOURS PRN
Qty: 90 TABLET | Refills: 0 | Status: SHIPPED
Start: 2021-09-21 | End: 2021-12-13

## 2021-09-21 RX ORDER — ZOLPIDEM TARTRATE 10 MG/1
TABLET ORAL
Qty: 30 TABLET | Refills: 2 | Status: SHIPPED | OUTPATIENT
Start: 2021-09-21 | End: 2021-11-21

## 2021-10-19 ENCOUNTER — TELEPHONE (OUTPATIENT)
Dept: PHARMACY | Facility: CLINIC | Age: 76
End: 2021-10-19

## 2021-10-19 NOTE — TELEPHONE ENCOUNTER
POPULATION HEALTH CLINICAL PHARMACY REVIEW: ADHERENCE REVIEW  Identified care gap per [employer]; fills at [facility]: Statin adherence    Last Visit: 6/22/21    Patient found in Outcomes MTM and is not currently eligible for CMR/TIP    STATIN ADHERENCE    Per Insurance Records through 9/11/21 (2020 [facility] = 71%; YTD [facility] = 85%; Potential Fail Date: 10/24/21):   ROSUVASTATIN TAB 20MG last filled on 6/22/21 for 90 day supply. Next refill due: 9/20/21    Per Reconciled Dispense Report:  ROSUVASTATIN TAB 20MG last filled on 6/22/21 for 90 day supply. Per [employer] Records-  ROSUVASTATIN TAB 20MG last filled on 9/22/21 for 90 day supply at [facility]    No pharmacy out reach to [facility] at this time       Lab Results   Component Value Date    CHOL 128 06/22/2021    TRIG 64 06/22/2021    HDL 62 06/22/2021    LDLCHOLESTEROL 132 (H) 08/21/2020    LDLCALC 53 06/22/2021     ALT   Date Value Ref Range Status   06/22/2021 8 0 - 32 U/L Final     AST   Date Value Ref Range Status   06/22/2021 25 0 - 31 U/L Final     The ASCVD Risk score (Jorge Do., et al., 2013) failed to calculate for the following reasons: The valid total cholesterol range is 130 to 320 mg/dL     PLAN  The following are interventions that have been identified:     No patient out reach planned at this time. For Pharmacy Admin Tracking Only     CPA in place:  No   Gap Closed?: No    Time Spent (min): 15          Future Appointments   Date Time Provider Lorri Sequeira   12/22/2021  1:00 PM MD REHANA Cole [facility]   8/15/2022 10:30 AM [facility] [address] [place] [address].   [address] [place] [address] free: [phone]

## 2021-12-22 ENCOUNTER — OFFICE VISIT (OUTPATIENT)
Dept: FAMILY MEDICINE CLINIC | Age: 76
End: 2021-12-22
Payer: MEDICARE

## 2021-12-22 VITALS
DIASTOLIC BLOOD PRESSURE: 80 MMHG | HEART RATE: 89 BPM | BODY MASS INDEX: 36.15 KG/M2 | TEMPERATURE: 97.8 F | OXYGEN SATURATION: 96 % | WEIGHT: 224 LBS | SYSTOLIC BLOOD PRESSURE: 130 MMHG

## 2021-12-22 DIAGNOSIS — R06.00 DYSPNEA, UNSPECIFIED TYPE: ICD-10-CM

## 2021-12-22 DIAGNOSIS — R30.0 DYSURIA: ICD-10-CM

## 2021-12-22 DIAGNOSIS — M79.604 RIGHT LEG PAIN: ICD-10-CM

## 2021-12-22 DIAGNOSIS — K21.9 GASTROESOPHAGEAL REFLUX DISEASE WITHOUT ESOPHAGITIS: ICD-10-CM

## 2021-12-22 DIAGNOSIS — R60.0 LOCALIZED EDEMA: ICD-10-CM

## 2021-12-22 DIAGNOSIS — R60.0 LOCALIZED EDEMA: Primary | ICD-10-CM

## 2021-12-22 DIAGNOSIS — E03.9 ACQUIRED HYPOTHYROIDISM: ICD-10-CM

## 2021-12-22 LAB
ALBUMIN SERPL-MCNC: 4.2 G/DL (ref 3.5–5.2)
ALP BLD-CCNC: 81 U/L (ref 35–104)
ALT SERPL-CCNC: 13 U/L (ref 0–32)
ANION GAP SERPL CALCULATED.3IONS-SCNC: 10 MMOL/L (ref 7–16)
AST SERPL-CCNC: 25 U/L (ref 0–31)
BASOPHILS ABSOLUTE: 0.07 E9/L (ref 0–0.2)
BASOPHILS RELATIVE PERCENT: 0.9 % (ref 0–2)
BILIRUB SERPL-MCNC: 0.5 MG/DL (ref 0–1.2)
BUN BLDV-MCNC: 18 MG/DL (ref 6–23)
CALCIUM SERPL-MCNC: 9.8 MG/DL (ref 8.6–10.2)
CHLORIDE BLD-SCNC: 99 MMOL/L (ref 98–107)
CO2: 25 MMOL/L (ref 22–29)
CREAT SERPL-MCNC: 1.3 MG/DL (ref 0.5–1)
EOSINOPHILS ABSOLUTE: 0.37 E9/L (ref 0.05–0.5)
EOSINOPHILS RELATIVE PERCENT: 5 % (ref 0–6)
GFR AFRICAN AMERICAN: 48
GFR NON-AFRICAN AMERICAN: 40 ML/MIN/1.73
GLUCOSE BLD-MCNC: 94 MG/DL (ref 74–99)
HCT VFR BLD CALC: 38 % (ref 34–48)
HEMOGLOBIN: 12.2 G/DL (ref 11.5–15.5)
IMMATURE GRANULOCYTES #: 0.01 E9/L
IMMATURE GRANULOCYTES %: 0.1 % (ref 0–5)
LYMPHOCYTES ABSOLUTE: 1.86 E9/L (ref 1.5–4)
LYMPHOCYTES RELATIVE PERCENT: 25.1 % (ref 20–42)
MCH RBC QN AUTO: 31.8 PG (ref 26–35)
MCHC RBC AUTO-ENTMCNC: 32.1 % (ref 32–34.5)
MCV RBC AUTO: 99 FL (ref 80–99.9)
MONOCYTES ABSOLUTE: 0.95 E9/L (ref 0.1–0.95)
MONOCYTES RELATIVE PERCENT: 12.8 % (ref 2–12)
NEUTROPHILS ABSOLUTE: 4.15 E9/L (ref 1.8–7.3)
NEUTROPHILS RELATIVE PERCENT: 56.1 % (ref 43–80)
PDW BLD-RTO: 14.8 FL (ref 11.5–15)
PLATELET # BLD: 253 E9/L (ref 130–450)
PMV BLD AUTO: 10.2 FL (ref 7–12)
POTASSIUM SERPL-SCNC: 4.3 MMOL/L (ref 3.5–5)
PRO-BNP: 333 PG/ML (ref 0–450)
RBC # BLD: 3.84 E12/L (ref 3.5–5.5)
SODIUM BLD-SCNC: 134 MMOL/L (ref 132–146)
TOTAL PROTEIN: 7.7 G/DL (ref 6.4–8.3)
WBC # BLD: 7.4 E9/L (ref 4.5–11.5)

## 2021-12-22 PROCEDURE — 99214 OFFICE O/P EST MOD 30 MIN: CPT | Performed by: INTERNAL MEDICINE

## 2021-12-22 PROCEDURE — 3288F FALL RISK ASSESSMENT DOCD: CPT | Performed by: INTERNAL MEDICINE

## 2021-12-22 RX ORDER — ROSUVASTATIN CALCIUM 20 MG/1
20 TABLET, COATED ORAL DAILY
Qty: 90 TABLET | Refills: 1 | Status: SHIPPED
Start: 2021-12-22 | End: 2022-08-08

## 2021-12-22 RX ORDER — AMLODIPINE BESYLATE 10 MG/1
TABLET ORAL
Qty: 90 TABLET | Refills: 1 | Status: SHIPPED
Start: 2021-12-22 | End: 2022-08-17

## 2021-12-22 RX ORDER — LEVOTHYROXINE SODIUM 0.07 MG/1
TABLET ORAL
Qty: 90 TABLET | Refills: 1 | Status: SHIPPED
Start: 2021-12-22 | End: 2022-03-08 | Stop reason: SDUPTHER

## 2021-12-22 RX ORDER — DULOXETIN HYDROCHLORIDE 60 MG/1
CAPSULE, DELAYED RELEASE ORAL
Qty: 90 CAPSULE | Refills: 1 | Status: SHIPPED
Start: 2021-12-22 | End: 2022-09-08

## 2021-12-22 RX ORDER — LABETALOL 100 MG/1
TABLET, FILM COATED ORAL
Qty: 180 TABLET | Refills: 1 | Status: ON HOLD
Start: 2021-12-22 | End: 2022-02-02 | Stop reason: HOSPADM

## 2021-12-22 RX ORDER — PANTOPRAZOLE SODIUM 40 MG/1
TABLET, DELAYED RELEASE ORAL
Qty: 90 TABLET | Refills: 1 | Status: SHIPPED
Start: 2021-12-22 | End: 2022-08-17

## 2021-12-22 RX ORDER — BUMETANIDE 2 MG/1
2 TABLET ORAL DAILY
Qty: 5 TABLET | Refills: 0 | Status: SHIPPED | OUTPATIENT
Start: 2021-12-22

## 2021-12-22 NOTE — PROGRESS NOTES
.  HPI: Patient is complaining of increased edema of the lower extremity. She is also complaining of pain at the distal area of her right tibia. She recently traveled to [facility] and was there for about a week. She felt well when she was there. She had noted increased swelling when she returned. Patient denies orthopnea or PND. Patient denies any chest pain. She does have chronic shortness of breath which is unchanged. Patient does use a CPAP at night. She denies any pleuritic pain or cough. She denies fever, chills, sweats. She believes she has bone cancer. Allergies: NKDA  PMH:  Problem List: Essential hypertension  Health Maintenance:  Influenza Vaccination - (2020)  Mammogram -   Colonoscopy - 2019 diverticular dx  Medical Problems:  Hypothyroidism, Hypertension, Anxiety, Osteoarthritis  Pulmonary HTN - (2018) [facility]-RIGHT SIDED HEART CATH  Incontinence, Peripheral Neuropathy  Diastolic Dysfunction - () CARDIOLOGY/PULMONARY CONSULTS   Sleep Apnea - (10/15/2019) RECENT ADJUSTMENT OF CPAP  Dizziness- Paul/ Rosemary   Surgical Hx:  Cataract Removal, Multiple urologic surgeries including stim, Removal of Gallbladder, Appendectomy,  Partial Hysterectomy  Reviewed, no changes. FH:  Father:  . (Hx)  due to CAD; Throat Cancer. Mother:  . (Hx)  due to Old age ? unknown. Brother 1:  . (Hx)  due to Pulmonary fibrosis. Brother 2:  . (Hx)  due to MI - age 47. Sister 1:  Coronary Artery Disease (CAD). Maternal Grandmother:  . (Hx)  due to Colon Cancer. Reviewed, no changes. SH:  Marital: Legal Status: . Personal Habits: Cigarette Use: Never Smoked Cigarettes. Alcohol: Social Very Rare. Drug Use:  Denies Drug Use. Reviewed, no changes. Date: 2021  Was the patient queried about smoking behavior? Yes   Does the patient currently smoke? Smoking: Nonsmoker.   Objective  Vitals:    21 1315   BP: 130/80   Pulse: 89   Temp: 97.8 °F (36.6 °C)   SpO2: 96%   Exam:  Const: Appears healthy, well developed and well nourished. Appears morbidly obese. Eyes: EOMI in both eyes. PERRL. ENMT: External ears WNL. Tympanic membranes are intact. External nose WNL. Neck: Supple and symmetric. Palpation reveals no adenopathy. No masses appreciated. Thyroid:  no nodule appreciated or thyromegaly. No jugular venous distention. Resp: Respirations are unlabored. Respiration rate is normal. Auscultate good airflow. No rales,  rhonchi or wheezes appreciated over the lungs bilaterally. CV: Rhythm is regular. S1 is normal. S2 is accentuated. S4 is audible. Carotids: no bruits. Abdominal aorta: not  palpable. Pedal pulses: 2+ and equal bilaterally. Extremities: No clubbing, cyanosis or edema. Abdomen: Bowel sounds are normoactive. Palpation reveals softness, with no distension,  organomegaly or tenderness. No abdominal masses palpable. No palpable hepatosplenomegaly. Musculo: Walks with a wide-based gait. Upper Extremities: ROM: Full ROM bilaterally. Lower  Extremities: ROM: Full ROM bilaterally. Skin: Dry and warm with no rash. Neuro: Alert and oriented x3. Mood is normal. Affect is normal. Speech is articulate and fluent. DTR's are symmetric, intact and 2+ bilaterally. Mike Lovelace was seen today for hypertension, hypothyroidism and hyperlipidemia. Diagnoses and all orders for this visit:    Acquired hypothyroidism    Gastroesophageal reflux disease without esophagitis    I did review Dr. Alverto Vigil and Dr. Deirdre Apgar note. Dr. Harinder Murry did indicate possible ENT referral which will be made because of continued symptoms. Patient is to have lab work. Patient is given Mycelex atrocious for what appears to be minimal oral candidiasis.

## 2021-12-23 DIAGNOSIS — E28.39 MENOPAUSE OVARIAN FAILURE: Primary | ICD-10-CM

## 2021-12-29 ENCOUNTER — HOSPITAL ENCOUNTER (OUTPATIENT)
Dept: MAMMOGRAPHY | Age: 76
Discharge: HOME | End: 2021-12-31
Payer: MEDICARE

## 2021-12-29 DIAGNOSIS — E28.39 MENOPAUSE OVARIAN FAILURE: ICD-10-CM

## 2021-12-29 PROCEDURE — 77080 DXA BONE DENSITY AXIAL: CPT

## 2022-01-03 RX ORDER — FLUTICASONE PROPIONATE 50 MCG
2 SPRAY, SUSPENSION (ML) NASAL DAILY
Qty: 16 G | Refills: 2 | Status: SHIPPED | OUTPATIENT
Start: 2022-01-03

## 2022-01-03 NOTE — TELEPHONE ENCOUNTER
Last Appointment:  12/22/2021  Future Appointments   Date Time Provider Lorri Sequeira   8/15/2022 10:30 AM DO Marcella Vaca [facility]

## 2022-02-01 ENCOUNTER — HOSPITAL ENCOUNTER (OUTPATIENT)
Age: 77
Setting detail: OBSERVATION
Discharge: HOME | End: 2022-02-02
Attending: EMERGENCY MEDICINE | Admitting: INTERNAL MEDICINE
Payer: MEDICARE

## 2022-02-01 ENCOUNTER — TELEPHONE (OUTPATIENT)
Dept: FAMILY MEDICINE CLINIC | Age: 77
End: 2022-02-01

## 2022-02-01 ENCOUNTER — OFFICE VISIT (OUTPATIENT)
Dept: FAMILY MEDICINE CLINIC | Age: 77
End: 2022-02-01
Payer: MEDICARE

## 2022-02-01 ENCOUNTER — APPOINTMENT (OUTPATIENT)
Dept: GENERAL RADIOLOGY | Age: 77
End: 2022-02-01
Payer: MEDICARE

## 2022-02-01 VITALS
HEIGHT: 66 IN | BODY MASS INDEX: 36.16 KG/M2 | TEMPERATURE: 97.4 F | SYSTOLIC BLOOD PRESSURE: 142 MMHG | OXYGEN SATURATION: 97 % | RESPIRATION RATE: 18 BRPM | WEIGHT: 225 LBS | HEART RATE: 64 BPM | DIASTOLIC BLOOD PRESSURE: 82 MMHG

## 2022-02-01 DIAGNOSIS — R00.1 BRADYCARDIA: ICD-10-CM

## 2022-02-01 DIAGNOSIS — R94.31 ABNORMAL EKG: Primary | ICD-10-CM

## 2022-02-01 DIAGNOSIS — R55 SYNCOPE AND COLLAPSE: Primary | ICD-10-CM

## 2022-02-01 DIAGNOSIS — R42 DIZZINESS: ICD-10-CM

## 2022-02-01 DIAGNOSIS — I49.1 PAC (PREMATURE ATRIAL CONTRACTION): ICD-10-CM

## 2022-02-01 DIAGNOSIS — I44.7 LEFT BUNDLE BRANCH BLOCK: ICD-10-CM

## 2022-02-01 LAB
ALBUMIN SERPL-MCNC: 3.8 G/DL (ref 3.5–5.2)
ALP BLD-CCNC: 89 U/L (ref 35–104)
ALT SERPL-CCNC: 10 U/L (ref 0–32)
ANION GAP SERPL CALCULATED.3IONS-SCNC: 14 MMOL/L (ref 7–16)
APTT: 27.2 SEC (ref 24.5–35.1)
AST SERPL-CCNC: 19 U/L (ref 0–31)
BASOPHILS ABSOLUTE: 0.09 E9/L (ref 0–0.2)
BASOPHILS RELATIVE PERCENT: 1.1 % (ref 0–2)
BILIRUB SERPL-MCNC: 0.3 MG/DL (ref 0–1.2)
BUN BLDV-MCNC: 13 MG/DL (ref 6–23)
CALCIUM SERPL-MCNC: 9.5 MG/DL (ref 8.6–10.2)
CHLORIDE BLD-SCNC: 98 MMOL/L (ref 98–107)
CO2: 22 MMOL/L (ref 22–29)
CREAT SERPL-MCNC: 1.1 MG/DL (ref 0.5–1)
D DIMER: 1656 NG/ML DDU
EKG ATRIAL RATE: 62 BPM
EKG P AXIS: 54 DEGREES
EKG P-R INTERVAL: 166 MS
EKG Q-T INTERVAL: 396 MS
EKG QRS DURATION: 92 MS
EKG QTC CALCULATION (BAZETT): 401 MS
EKG R AXIS: 42 DEGREES
EKG T AXIS: 50 DEGREES
EKG VENTRICULAR RATE: 62 BPM
EOSINOPHILS ABSOLUTE: 0.54 E9/L (ref 0.05–0.5)
EOSINOPHILS RELATIVE PERCENT: 6.3 % (ref 0–6)
GFR AFRICAN AMERICAN: 58
GFR NON-AFRICAN AMERICAN: 48 ML/MIN/1.73
GLUCOSE BLD-MCNC: 106 MG/DL (ref 74–99)
HCT VFR BLD CALC: 34.5 % (ref 34–48)
HEMOGLOBIN: 11.4 G/DL (ref 11.5–15.5)
IMMATURE GRANULOCYTES #: 0.02 E9/L
IMMATURE GRANULOCYTES %: 0.2 % (ref 0–5)
INR BLD: 1.1
LACTIC ACID: 1.2 MMOL/L (ref 0.5–2.2)
LYMPHOCYTES ABSOLUTE: 2.21 E9/L (ref 1.5–4)
LYMPHOCYTES RELATIVE PERCENT: 25.8 % (ref 20–42)
MCH RBC QN AUTO: 31.5 PG (ref 26–35)
MCHC RBC AUTO-ENTMCNC: 33 % (ref 32–34.5)
MCV RBC AUTO: 95.3 FL (ref 80–99.9)
MONOCYTES ABSOLUTE: 1.25 E9/L (ref 0.1–0.95)
MONOCYTES RELATIVE PERCENT: 14.6 % (ref 2–12)
NEUTROPHILS ABSOLUTE: 4.44 E9/L (ref 1.8–7.3)
NEUTROPHILS RELATIVE PERCENT: 52 % (ref 43–80)
PDW BLD-RTO: 14.6 FL (ref 11.5–15)
PLATELET # BLD: 318 E9/L (ref 130–450)
PMV BLD AUTO: 9.7 FL (ref 7–12)
POTASSIUM SERPL-SCNC: 3.9 MMOL/L (ref 3.5–5)
PROTHROMBIN TIME: 12.7 SEC (ref 9.3–12.4)
RBC # BLD: 3.62 E12/L (ref 3.5–5.5)
REASON FOR REJECTION: NORMAL
REASON FOR REJECTION: NORMAL
REJECTED TEST: NORMAL
REJECTED TEST: NORMAL
SODIUM BLD-SCNC: 134 MMOL/L (ref 132–146)
TOTAL PROTEIN: 7.2 G/DL (ref 6.4–8.3)
TROPONIN, HIGH SENSITIVITY: 18 NG/L (ref 0–9)
TROPONIN, HIGH SENSITIVITY: 19 NG/L (ref 0–9)
WBC # BLD: 8.6 E9/L (ref 4.5–11.5)

## 2022-02-01 PROCEDURE — 84484 ASSAY OF TROPONIN QUANT: CPT

## 2022-02-01 PROCEDURE — 83605 ASSAY OF LACTIC ACID: CPT

## 2022-02-01 PROCEDURE — 93000 ELECTROCARDIOGRAM COMPLETE: CPT | Performed by: PHYSICIAN ASSISTANT

## 2022-02-01 PROCEDURE — 99282 EMERGENCY DEPT VISIT SF MDM: CPT

## 2022-02-01 PROCEDURE — 85378 FIBRIN DEGRADE SEMIQUANT: CPT

## 2022-02-01 PROCEDURE — 85610 PROTHROMBIN TIME: CPT

## 2022-02-01 PROCEDURE — 6370000000 HC RX 637 (ALT 250 FOR IP): Performed by: NURSE PRACTITIONER

## 2022-02-01 PROCEDURE — 6370000000 HC RX 637 (ALT 250 FOR IP): Performed by: EMERGENCY MEDICINE

## 2022-02-01 PROCEDURE — 93005 ELECTROCARDIOGRAM TRACING: CPT | Performed by: PHYSICIAN ASSISTANT

## 2022-02-01 PROCEDURE — G0378 HOSPITAL OBSERVATION PER HR: HCPCS

## 2022-02-01 PROCEDURE — 6360000002 HC RX W HCPCS: Performed by: NURSE PRACTITIONER

## 2022-02-01 PROCEDURE — 71046 X-RAY EXAM CHEST 2 VIEWS: CPT

## 2022-02-01 PROCEDURE — 2580000003 HC RX 258: Performed by: NURSE PRACTITIONER

## 2022-02-01 PROCEDURE — 85730 THROMBOPLASTIN TIME PARTIAL: CPT

## 2022-02-01 PROCEDURE — 80053 COMPREHEN METABOLIC PANEL: CPT

## 2022-02-01 PROCEDURE — 99214 OFFICE O/P EST MOD 30 MIN: CPT | Performed by: PHYSICIAN ASSISTANT

## 2022-02-01 PROCEDURE — 85025 COMPLETE CBC W/AUTO DIFF WBC: CPT

## 2022-02-01 PROCEDURE — 96372 THER/PROPH/DIAG INJ SC/IM: CPT

## 2022-02-01 PROCEDURE — 6370000000 HC RX 637 (ALT 250 FOR IP): Performed by: INTERNAL MEDICINE

## 2022-02-01 PROCEDURE — 36415 COLL VENOUS BLD VENIPUNCTURE: CPT

## 2022-02-01 RX ORDER — ONDANSETRON 2 MG/ML
4 INJECTION INTRAMUSCULAR; INTRAVENOUS EVERY 6 HOURS PRN
Status: DISCONTINUED | OUTPATIENT
Start: 2022-02-01 | End: 2022-02-02 | Stop reason: HOSPADM

## 2022-02-01 RX ORDER — ACETAMINOPHEN 325 MG/1
650 TABLET ORAL EVERY 6 HOURS PRN
Status: DISCONTINUED | OUTPATIENT
Start: 2022-02-01 | End: 2022-02-02 | Stop reason: HOSPADM

## 2022-02-01 RX ORDER — ROSUVASTATIN CALCIUM 20 MG/1
20 TABLET, COATED ORAL DAILY
Status: DISCONTINUED | OUTPATIENT
Start: 2022-02-02 | End: 2022-02-02 | Stop reason: HOSPADM

## 2022-02-01 RX ORDER — AMLODIPINE BESYLATE 5 MG/1
10 TABLET ORAL ONCE
Status: COMPLETED | OUTPATIENT
Start: 2022-02-01 | End: 2022-02-01

## 2022-02-01 RX ORDER — FLUTICASONE PROPIONATE 50 MCG
2 SPRAY, SUSPENSION (ML) NASAL DAILY
Status: DISCONTINUED | OUTPATIENT
Start: 2022-02-02 | End: 2022-02-02 | Stop reason: HOSPADM

## 2022-02-01 RX ORDER — LORAZEPAM 0.5 MG/1
0.5 TABLET ORAL EVERY 6 HOURS PRN
Status: DISCONTINUED | OUTPATIENT
Start: 2022-02-01 | End: 2022-02-02 | Stop reason: HOSPADM

## 2022-02-01 RX ORDER — AMLODIPINE BESYLATE 10 MG/1
10 TABLET ORAL DAILY
Status: DISCONTINUED | OUTPATIENT
Start: 2022-02-02 | End: 2022-02-02 | Stop reason: HOSPADM

## 2022-02-01 RX ORDER — ZOLPIDEM TARTRATE 10 MG/1
TABLET ORAL
COMMUNITY
Start: 2021-12-20 | End: 2022-02-23

## 2022-02-01 RX ORDER — ACETAMINOPHEN 650 MG/1
650 SUPPOSITORY RECTAL EVERY 6 HOURS PRN
Status: DISCONTINUED | OUTPATIENT
Start: 2022-02-01 | End: 2022-02-02 | Stop reason: HOSPADM

## 2022-02-01 RX ORDER — CLOTRIMAZOLE 10 MG/1
10 LOZENGE ORAL; TOPICAL
Status: DISCONTINUED | OUTPATIENT
Start: 2022-02-01 | End: 2022-02-02 | Stop reason: HOSPADM

## 2022-02-01 RX ORDER — SODIUM CHLORIDE 0.9 % (FLUSH) 0.9 %
5-40 SYRINGE (ML) INJECTION EVERY 12 HOURS SCHEDULED
Status: DISCONTINUED | OUTPATIENT
Start: 2022-02-01 | End: 2022-02-02 | Stop reason: HOSPADM

## 2022-02-01 RX ORDER — PROMETHAZINE HYDROCHLORIDE 25 MG/1
12.5 TABLET ORAL EVERY 6 HOURS PRN
Status: DISCONTINUED | OUTPATIENT
Start: 2022-02-01 | End: 2022-02-02 | Stop reason: HOSPADM

## 2022-02-01 RX ORDER — LEVOTHYROXINE SODIUM 0.07 MG/1
75 TABLET ORAL DAILY
Status: DISCONTINUED | OUTPATIENT
Start: 2022-02-02 | End: 2022-02-02 | Stop reason: HOSPADM

## 2022-02-01 RX ORDER — DULOXETIN HYDROCHLORIDE 60 MG/1
60 CAPSULE, DELAYED RELEASE ORAL DAILY
Status: DISCONTINUED | OUTPATIENT
Start: 2022-02-02 | End: 2022-02-02 | Stop reason: HOSPADM

## 2022-02-01 RX ORDER — SODIUM CHLORIDE 0.9 % (FLUSH) 0.9 %
5-40 SYRINGE (ML) INJECTION PRN
Status: DISCONTINUED | OUTPATIENT
Start: 2022-02-01 | End: 2022-02-02 | Stop reason: HOSPADM

## 2022-02-01 RX ORDER — NITROFURANTOIN 25; 75 MG/1; MG/1
CAPSULE ORAL
Status: ON HOLD | COMMUNITY
Start: 2021-11-10 | End: 2022-02-02 | Stop reason: HOSPADM

## 2022-02-01 RX ORDER — ZOLPIDEM TARTRATE 5 MG/1
5 TABLET ORAL NIGHTLY PRN
Status: DISCONTINUED | OUTPATIENT
Start: 2022-02-01 | End: 2022-02-02 | Stop reason: HOSPADM

## 2022-02-01 RX ORDER — SODIUM CHLORIDE 9 MG/ML
25 INJECTION, SOLUTION INTRAVENOUS PRN
Status: DISCONTINUED | OUTPATIENT
Start: 2022-02-01 | End: 2022-02-02 | Stop reason: HOSPADM

## 2022-02-01 RX ORDER — SENNA PLUS 8.6 MG/1
1 TABLET ORAL DAILY PRN
Status: DISCONTINUED | OUTPATIENT
Start: 2022-02-01 | End: 2022-02-02 | Stop reason: HOSPADM

## 2022-02-01 RX ADMIN — CLOTRIMAZOLE 10 MG: 10 LOZENGE ORAL; TOPICAL at 23:15

## 2022-02-01 RX ADMIN — ENOXAPARIN SODIUM 30 MG: 30 INJECTION SUBCUTANEOUS at 23:15

## 2022-02-01 RX ADMIN — ZOLPIDEM TARTRATE 5 MG: 5 TABLET ORAL at 23:15

## 2022-02-01 RX ADMIN — AMLODIPINE BESYLATE 10 MG: 5 TABLET ORAL at 23:14

## 2022-02-01 RX ADMIN — Medication 10 ML: at 23:17

## 2022-02-01 ASSESSMENT — PAIN SCALES - GENERAL: PAINLEVEL_OUTOF10: 0

## 2022-02-01 NOTE — TELEPHONE ENCOUNTER
I did phone patient I had to leave a message. I did phone daughter Bisi Carroll and had to leave a message(she is in communication form). I phoned son Constanza and was able to relay message. He voiced understanding. He will try to reach Alex Márquez now. Await call back. He is on communication form.

## 2022-02-01 NOTE — TELEPHONE ENCOUNTER
Physical therapist jayson calling in, she did an in clinic eval on "[employer] today. ENT Dr Tamera Barron referred her for dizziness. He has not seen her since Aug. At eval patient expressed to her two week ago she was so dizzy she fell and lost consciousness. She stated this happened a second time too. PT took her vitals today and discovered her heart rate was 48 thready & irregular. She does feel she needs therapy for general weakness and balance but wants her cleared by pcp first before proceeding with exercises. She has an appt with you on 02/04, Physical therapist is unsure if you wish to see her sooner.

## 2022-02-01 NOTE — PROGRESS NOTES
22  Joseluis Lopes : 1945 Sex: female  Age 64 y.o. Subjective:  Chief Complaint   Patient presents with    Bradycardia     Patient was in physical therapy this AM.. heart rate was at 48. . patient concerned          HPI:   Joseluis Lopes , 64 y.o. female presents to [facility] for evaluation of bradycardia    HPI  77-year-old female with a history of anxiety, diastolic dysfunction, dizziness, hypertension, hypothyroidism, osteoarthritis presents to [facility] for evaluation of slow heart rate. The patient was sent in for EKG to ensure that she is not in atrial fibrillation she was at physical therapy today and was noted to have a heart rate of 48. The patient states that she is extremely dizzy. The patient has been passing out for the last week and a half. The patient become more fatigued than normal.  She was going to physical therapy for a vertigo work-up. The patient is not having any significant headaches. The patient is not having any chest pain. ROS:   Unless otherwise stated in this report the patient's positive and negative responses for review of systems for constitutional, eyes, ENT, cardiovascular, respiratory, gastrointestinal, neurological, , musculoskeletal, and integument systems and related systems to the presenting problem are either stated in the history of present illness or were not pertinent or were negative for the symptoms and/or complaints related to the presenting medical problem. Positives and pertinent negatives as per HPI. All others reviewed and are negative.       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       Past Surgical History:   Procedure Laterality Date    APPENDECTOMY      BLADDER SURGERY      CARDIAC CATHETERIZATION Right 05/2018    CATARACT REMOVAL      CHOLECYSTECTOMY      HYSTERECTOMY      OTHER SURGICAL HISTORY      multiple urologic surgeries including stim       Family History   Problem Relation Age of Onset    No Known Problems Mother     Cancer Father         larynx    Heart Disease Father     Coronary Art Dis Father     Other Brother         pulmonary fibrosis    Heart Attack Brother     Coronary Art Dis Sister     Colon Cancer Maternal Grandmother        Medications:     Current Outpatient Medications:     nitrofurantoin, macrocrystal-monohydrate, (MACROBID) 100 MG capsule, TAKE ONE CAPSULE BY MOUTH TWO TIMES A DAY, Disp: , Rfl:     zolpidem (AMBIEN) 10 MG tablet, TAKE ONE TABLET BY MOUTH EVERY DAY AT BEDTIME, Disp: , Rfl:     fluticasone (FLONASE) 50 MCG/ACT nasal spray, 2 sprays by Nasal route daily, Disp: 16 g, Rfl: 2    labetalol (NORMODYNE) 100 MG tablet, TAKE ONE TABLET BY MOUTH TWO TIMES A DAY, Disp: 180 tablet, Rfl: 1    DULoxetine (CYMBALTA) 60 MG extended release capsule, TAKE ONE CAPSULE BY MOUTH EVERY DAY, Disp: 90 capsule, Rfl: 1    amLODIPine (NORVASC) 10 MG tablet, TAKE ONE TABLET BY MOUTH DAILY, Disp: 90 tablet, Rfl: 1    levothyroxine (SYNTHROID) 75 MCG tablet, One po daily, Disp: 90 tablet, Rfl: 1    pantoprazole (PROTONIX) 40 MG tablet, TAKE ONE TABLET BY MOUTH DAILY, Disp: 90 tablet, Rfl: 1    rosuvastatin (CRESTOR) 20 MG tablet, Take 1 tablet by mouth daily, Disp: 90 tablet, Rfl: 1    bumetanide (BUMEX) 2 MG tablet, Take 1 tablet by mouth daily, Disp: 5 tablet, Rfl: 0    LORazepam (ATIVAN) 0.5 MG tablet, TAKE ONE TABLET BY MOUTH EVERY 8 HOURS as needed for anxiety, Disp: 90 tablet, Rfl: 0    furosemide (LASIX) 20 MG tablet, TAKE TWO TABLETS BY MOUTH DAILY, Disp: 60 tablet, Rfl: 5    clotrimazole (MYCELEX) 10 MG ritika, Take 10 mg by mouth 5 times daily, Disp: , Rfl:     Multiple Vitamin (MVI, CELEBRATE, CHEWABLE TABLET), Take one(1) tablet daily. , Disp: , Rfl:     vitamin C (ASCORBIC ACID) 500 MG tablet, Take by mouth, Disp: , Rfl:     potassium chloride (KLOR-CON) 20 MEQ packet, Take 40 mEq by mouth daily Only when taking water pills, Disp: , Rfl:     Allergies: Allergies   Allergen Reactions    Fesoterodine Rash       Social History:     Social History     Tobacco Use    Smoking status: Never Smoker    Smokeless tobacco: Never Used   Substance Use Topics    Alcohol use: Yes     Comment: Socially    Drug use: No       Patient lives at home. Physical Exam:     Vitals:    02/01/22 1530   BP: (!) 142/82   Pulse: 64   Resp: 18   Temp: 97.4 °F (36.3 °C)   SpO2: 97%   Weight: 225 lb (102.1 kg)   Height: 5' 6\" (1.676 m)       Exam:  Physical Exam  Nurses note and vital signs reviewed and patient is not hypoxic. General: The patient appears well and in no apparent distress. Patient is resting comfortably on cart. Skin: Warm, dry, no pallor noted. There is no rash noted. Head: Normocephalic, atraumatic. Eye: Normal conjunctiva  Ears, Nose, Mouth, and Throat: Oral mucosa is moist  Cardiovascular: Regular Rate  Respiratory: Patient is in no distress, no accessory muscle use, no respiratory distress, the patient speaks in full sentences, no audible wheezing  Neurological: A&O x4, normal speech      Testing:           Medical Decision Making:     Vital signs reviewed    Past medical history reviewed. Allergies reviewed. Medications reviewed. Patient on arrival does not appear to be in any apparent distress or discomfort. The patient has been seen and evaluated. The patient does not appear to be toxic or lethargic. EKG shows sinus rhythm, PAC, appears to be developing left bundle branch block, no definite ST segment elevation, there is T wave inversion noted nonspecific ST-T wave changes. Rate of 60.     We discussed differential diagnosis with the patient and with family. Also discussed with PCP they recommended the patient be evaluated in the emergency department for further evaluation. Clinical Impression:   Bradley Rutherford was seen today for bradycardia. Diagnoses and all orders for this visit:    Abnormal EKG    Bradycardia  -     EKG 12 lead;  Future  -     EKG 12 lead    Left bundle branch block    PAC (premature atrial contraction)    Dizziness      Go directly to the ED    SIGNATURE: Aaliyah Fontana III, PA-C

## 2022-02-01 NOTE — TELEPHONE ENCOUNTER
She should either go to the emergency room or come through [facility] and get an EKG. I want to assure that this heart rate is not atrial fibrillation.

## 2022-02-01 NOTE — ED PROVIDER NOTES
HPI:  2/1/22,   Time: 6:33 PM SUE Arechiga is a 64 y.o. female presenting to the ED for syncope, beginning 8 hrs ago. The complaint has been intermittent, severe in severity, and worsened by nothing. Recurrent episodes past 10 days, most recent today at pt, hr 48, sent to ,  did ekg and sent here. Told abnml ekg. Pt no c/o currently. Was dizzy before episode, was at pt for dizziness.  note states syncope for past 10 day intermittent. Denies cp/abd pain/sob/leg swelling/cough/congestoin/abd pain/fever/chills/sweats/n/v/d    Review of Systems:   Pertinent positives and negatives are stated within HPI, all other systems reviewed and are negative.          --------------------------------------------- PAST HISTORY ---------------------------------------------  Past Medical History:  has a past medical history of Anxiety, Arthritis, Diastolic dysfunction, Dizziness and giddiness, Hypertension, Hypothyroidism, Neuropathy, Osteoarthritis, Sleep apnea, Tinnitus of right ear, Urinary incontinence, and Vestibulopathy. Past Surgical History:  has a past surgical history that includes Bladder surgery; Cholecystectomy; Appendectomy; Hysterectomy; Cardiac catheterization (Right, 05/2018); Cataract removal; and other surgical history. Social History:  reports that she has never smoked. She has never used smokeless tobacco. She reports current alcohol use. She reports that she does not use drugs. Family History: family history includes Cancer in her father; Colon Cancer in her maternal grandmother; Coronary Art Dis in her father and sister; Heart Attack in her brother; Heart Disease in her father; No Known Problems in her mother; Other in her brother. The patients home medications have been reviewed.     Allergies: Fesoterodine        ---------------------------------------------------PHYSICAL EXAM--------------------------------------    Constitutional/General: Alert and oriented x3, well appearing, non toxic in NAD  Head: Normocephalic and atraumatic  Eyes: PERRL, EOMI, conjunctive normal, sclera non icteric  Mouth: Oropharynx clear, handling secretions,   Neck: Supple, full ROM,   Respiratory:  Not in respiratory distress  Cardiovascular:  Regular rate. Regular rhythm. 2+ distal pulses  Chest: No chest wall tenderness  GI:  Abdomen Soft, Non tender, Non distended. +BS. No organomegaly, no palpable masses,  No rebound, guarding, or rigidity. Musculoskeletal: Moves all extremities x 4. Warm and well perfused, no clubbing, cyanosis, or edema. Capillary refill <3 seconds  Integument: skin warm and dry. No rashes. Lymphatic: no lymphadenopathy noted  Neurologic: GCS 15, no focal deficits,   Psychiatric: Normal Affect    -------------------------------------------------- RESULTS -------------------------------------------------  I have personally reviewed all laboratory and imaging results for this patient. Results are listed below.      LABS:  Results for orders placed or performed during the hospital encounter of 02/01/22   Comprehensive Metabolic Panel   Result Value Ref Range    Sodium 134 132 - 146 mmol/L    Potassium 3.9 3.5 - 5.0 mmol/L    Chloride 98 98 - 107 mmol/L    CO2 22 22 - 29 mmol/L    Anion Gap 14 7 - 16 mmol/L    Glucose 106 (H) 74 - 99 mg/dL    BUN 13 6 - 23 mg/dL    CREATININE 1.1 (H) 0.5 - 1.0 mg/dL    GFR Non-African American 48 >=60 mL/min/1.73    GFR African American 58     Calcium 9.5 8.6 - 10.2 mg/dL    Total Protein 7.2 6.4 - 8.3 g/dL    Albumin 3.8 3.5 - 5.2 g/dL    Total Bilirubin 0.3 0.0 - 1.2 mg/dL    Alkaline Phosphatase 89 35 - 104 U/L    ALT 10 0 - 32 U/L    AST 19 0 - 31 U/L   Troponin   Result Value Ref Range    Troponin, High Sensitivity 19 (H) 0 - 9 ng/L   Protime-INR   Result Value Ref Range    Protime 12.7 (H) 9.3 - 12.4 sec    INR 1.1    APTT   Result Value Ref Range    aPTT 27.2 24.5 - 35.1 sec   Lactic Acid, Plasma   Result Value Ref Range    Lactic Acid 1.2 0.5 - 2.2 mmol/L   SPECIMEN REJECTION   Result Value Ref Range    Rejected Test CBCWD     Reason for Rejection see below    EKG 12 Lead   Result Value Ref Range    Ventricular Rate 62 BPM    Atrial Rate 62 BPM    P-R Interval 166 ms    QRS Duration 92 ms    Q-T Interval 396 ms    QTc Calculation (Bazett) 401 ms    P Axis 54 degrees    R Axis 42 degrees    T Axis 50 degrees       RADIOLOGY:  Interpreted by Radiologist.  XR CHEST (2 VW)   Final Result   No acute process. Hiatal hernia. EKG:  This EKG is signed and interpreted by the EP. Time: 1739  Rate: 60  Rhythm: Sinus  Interpretation: non-specific EKG  Comparison: None      ------------------------- NURSING NOTES AND VITALS REVIEWED ---------------------------   The nursing notes within the ED encounter and vital signs as below have been reviewed by myself. BP (!) 175/74   Pulse 68   Temp 98.2 °F (36.8 °C)   Resp 16   Ht 5' 6\" (1.676 m)   Wt 225 lb (102.1 kg)   SpO2 94%   BMI 36.32 kg/m²   Oxygen Saturation Interpretation: Normal    The patients available past medical records and past encounters were reviewed. ------------------------------ ED COURSE/MEDICAL DECISION MAKING----------------------  Medications - No data to display      ED COURSE:       Medical Decision Making:    Recurrent syncope, abnml ekg at , but no t wave inversions on our ekg. No c/o in ed, will obs for further car      This patient's ED course included: a personal history and physicial examination    This patient has remained hemodynamically stable during their ED course. Re-Evaluations:             Re-evaluation. Patients symptoms show no change        Consultations:             gali    Critical Care:         Counseling:    The emergency provider has spoken with the patient and family member patient and son and discussed todays results, in addition to providing specific details for the plan of care and counseling regarding the diagnosis and prognosis. Questions are answered at this time and they are agreeable with the plan.       --------------------------------- IMPRESSION AND DISPOSITION ---------------------------------    IMPRESSION  1. Syncope and collapse        DISPOSITION  Disposition: Admit to telemetry  Patient condition is stable    NOTE: This report was transcribed using voice recognition software.  Every effort was made to ensure accuracy; however, inadvertent computerized transcription errors may be present        Unique Avila MD  02/01/22 2023

## 2022-02-02 VITALS
SYSTOLIC BLOOD PRESSURE: 140 MMHG | WEIGHT: 226.4 LBS | DIASTOLIC BLOOD PRESSURE: 53 MMHG | HEIGHT: 66 IN | HEART RATE: 60 BPM | RESPIRATION RATE: 16 BRPM | OXYGEN SATURATION: 99 % | TEMPERATURE: 98.8 F | BODY MASS INDEX: 36.38 KG/M2

## 2022-02-02 PROBLEM — H93.11 TINNITUS OF RIGHT EAR: Status: RESOLVED | Noted: 2021-02-08 | Resolved: 2022-02-02

## 2022-02-02 PROBLEM — H81.90 VESTIBULOPATHY: Status: RESOLVED | Noted: 2021-02-08 | Resolved: 2022-02-02

## 2022-02-02 PROBLEM — M79.604 RIGHT LEG PAIN: Status: RESOLVED | Noted: 2021-12-22 | Resolved: 2022-02-02

## 2022-02-02 LAB
ALBUMIN SERPL-MCNC: 3.5 G/DL (ref 3.5–5.2)
ALP BLD-CCNC: 79 U/L (ref 35–104)
ALT SERPL-CCNC: 8 U/L (ref 0–32)
ANION GAP SERPL CALCULATED.3IONS-SCNC: 10 MMOL/L (ref 7–16)
AST SERPL-CCNC: 16 U/L (ref 0–31)
BASOPHILS ABSOLUTE: 0.07 E9/L (ref 0–0.2)
BASOPHILS RELATIVE PERCENT: 0.9 % (ref 0–2)
BILIRUB SERPL-MCNC: 0.3 MG/DL (ref 0–1.2)
BUN BLDV-MCNC: 14 MG/DL (ref 6–23)
CALCIUM SERPL-MCNC: 8.8 MG/DL (ref 8.6–10.2)
CHLORIDE BLD-SCNC: 101 MMOL/L (ref 98–107)
CO2: 22 MMOL/L (ref 22–29)
CREAT SERPL-MCNC: 1 MG/DL (ref 0.5–1)
EOSINOPHILS ABSOLUTE: 0.55 E9/L (ref 0.05–0.5)
EOSINOPHILS RELATIVE PERCENT: 6.9 % (ref 0–6)
GFR AFRICAN AMERICAN: >60
GFR NON-AFRICAN AMERICAN: 54 ML/MIN/1.73
GLUCOSE BLD-MCNC: 110 MG/DL (ref 74–99)
HCT VFR BLD CALC: 31.4 % (ref 34–48)
HEMOGLOBIN: 10.4 G/DL (ref 11.5–15.5)
IMMATURE GRANULOCYTES #: 0.02 E9/L
IMMATURE GRANULOCYTES %: 0.3 % (ref 0–5)
LYMPHOCYTES ABSOLUTE: 1.99 E9/L (ref 1.5–4)
LYMPHOCYTES RELATIVE PERCENT: 25.1 % (ref 20–42)
MCH RBC QN AUTO: 31.4 PG (ref 26–35)
MCHC RBC AUTO-ENTMCNC: 33.1 % (ref 32–34.5)
MCV RBC AUTO: 94.9 FL (ref 80–99.9)
MONOCYTES ABSOLUTE: 1.1 E9/L (ref 0.1–0.95)
MONOCYTES RELATIVE PERCENT: 13.9 % (ref 2–12)
NEUTROPHILS ABSOLUTE: 4.2 E9/L (ref 1.8–7.3)
NEUTROPHILS RELATIVE PERCENT: 52.9 % (ref 43–80)
PDW BLD-RTO: 14.6 FL (ref 11.5–15)
PLATELET # BLD: 299 E9/L (ref 130–450)
PMV BLD AUTO: 9.6 FL (ref 7–12)
POTASSIUM REFLEX MAGNESIUM: 3.7 MMOL/L (ref 3.5–5)
RBC # BLD: 3.31 E12/L (ref 3.5–5.5)
REASON FOR REJECTION: NORMAL
REJECTED TEST: NORMAL
SODIUM BLD-SCNC: 133 MMOL/L (ref 132–146)
T4 FREE: 1.2 NG/DL (ref 0.93–1.7)
TOTAL PROTEIN: 6.4 G/DL (ref 6.4–8.3)
TROPONIN, HIGH SENSITIVITY: 15 NG/L (ref 0–9)
TSH SERPL DL<=0.05 MIU/L-ACNC: 2.32 UIU/ML (ref 0.27–4.2)
WBC # BLD: 7.9 E9/L (ref 4.5–11.5)

## 2022-02-02 PROCEDURE — 85025 COMPLETE CBC W/AUTO DIFF WBC: CPT

## 2022-02-02 PROCEDURE — 97161 PT EVAL LOW COMPLEX 20 MIN: CPT

## 2022-02-02 PROCEDURE — 84443 ASSAY THYROID STIM HORMONE: CPT

## 2022-02-02 PROCEDURE — G0378 HOSPITAL OBSERVATION PER HR: HCPCS

## 2022-02-02 PROCEDURE — 80053 COMPREHEN METABOLIC PANEL: CPT

## 2022-02-02 PROCEDURE — 2580000003 HC RX 258: Performed by: NURSE PRACTITIONER

## 2022-02-02 PROCEDURE — 6370000000 HC RX 637 (ALT 250 FOR IP): Performed by: NURSE PRACTITIONER

## 2022-02-02 PROCEDURE — 96372 THER/PROPH/DIAG INJ SC/IM: CPT

## 2022-02-02 PROCEDURE — 36415 COLL VENOUS BLD VENIPUNCTURE: CPT

## 2022-02-02 PROCEDURE — 84439 ASSAY OF FREE THYROXINE: CPT

## 2022-02-02 PROCEDURE — 6360000002 HC RX W HCPCS: Performed by: NURSE PRACTITIONER

## 2022-02-02 PROCEDURE — 84484 ASSAY OF TROPONIN QUANT: CPT

## 2022-02-02 RX ORDER — LABETALOL 100 MG/1
50 TABLET, FILM COATED ORAL EVERY 12 HOURS SCHEDULED
Qty: 60 TABLET | Refills: 0 | Status: SHIPPED | OUTPATIENT
Start: 2022-02-02 | End: 2022-07-13 | Stop reason: ALTCHOICE

## 2022-02-02 RX ORDER — LABETALOL 100 MG/1
50 TABLET, FILM COATED ORAL EVERY 12 HOURS SCHEDULED
Status: DISCONTINUED | OUTPATIENT
Start: 2022-02-02 | End: 2022-02-02 | Stop reason: HOSPADM

## 2022-02-02 RX ADMIN — AMLODIPINE BESYLATE 10 MG: 10 TABLET ORAL at 09:30

## 2022-02-02 RX ADMIN — ENOXAPARIN SODIUM 30 MG: 30 INJECTION SUBCUTANEOUS at 09:30

## 2022-02-02 RX ADMIN — CLOTRIMAZOLE 10 MG: 10 LOZENGE ORAL; TOPICAL at 05:19

## 2022-02-02 RX ADMIN — LEVOTHYROXINE SODIUM 75 MCG: 75 TABLET ORAL at 05:20

## 2022-02-02 RX ADMIN — FLUTICASONE PROPIONATE 2 SPRAY: 50 SPRAY, METERED NASAL at 09:29

## 2022-02-02 RX ADMIN — CLOTRIMAZOLE 10 MG: 10 LOZENGE ORAL; TOPICAL at 11:00

## 2022-02-02 RX ADMIN — Medication 10 ML: at 09:31

## 2022-02-02 RX ADMIN — DULOXETINE HYDROCHLORIDE 60 MG: 60 CAPSULE, DELAYED RELEASE ORAL at 09:30

## 2022-02-02 ASSESSMENT — PAIN SCALES - GENERAL
PAINLEVEL_OUTOF10: 0
PAINLEVEL_OUTOF10: 0

## 2022-02-02 NOTE — CARE COORDINATION
Patient presented to ED from urgent care. She had apparently been experiencing syncope over the last few days at time. She was found to have EKG changes as well as bradycardia. She is admitted under tele, with cardio consult. Home beta blocker and diuretics held.

## 2022-02-02 NOTE — CARE COORDINATION
Social Work/Discharge Planning:  Social Work consult noted. Met with patient and completed initial assessment. Explained Social Work role and discussed transition of care/discharge planning. Patient lives alone in a one story condo with one step to enter. PTA patient used a cane, but recently has been using a rollator walker. She has a shower chair and a cpap through [facility].  She denies any history with hhc or snf. Patient PCP Dr. Lucho Shaw and pharmacy is [facility]. She states plan is home possibly today. She denies any home care needs at this time. Will continue to follow and assist with discharge planning.   Electronically signed by SLOAN Reynoso on 2/2/2022 at 10:40 AM

## 2022-02-02 NOTE — CONSULTS
[facility] at [address] [place] [address]    Name: Stefan Angel    Age: 64 y.o. Date of Admission: 2/1/2022  6:17 PM    Date of Service: 2/2/2022    Reason for Consultation: sinus bradycardia, PAC    Referring Physician: Dr Hong Can  Primary Care Physician: Jin Hubbard MD    History of Present Illness: The patient is a 64y.o. year old female presenting with dizziness  And sinus bradycardia from urgent carde yesterday. States has frequent dizziness for a long time. States 10 days before admission she got up from bed andfelt very dizzy, tried to stand up and states passed out for a brief period. She tells me has had no other syncopal spells. Lives alone. States just pretty much stayed in bed the rest of the day. Had severe dizziness a few days later while laying in bed so stayed there for the day as well. .States suppose to have some PT, but the  Therapist told her her heart rate was too low to do so. Went to [facility] yesterday and sent to ED as heart rate was into the upper 40's. H/O GIOVANI, normal coronary arteries on heart cath 2014, difficult to control hypertension. HF pEF, evaluated at [facility] for pulmonary HTN with rt heart cath, only modestly elevated pulm pressures. Echo [facility] 2018 EF>60%, stage I DD, 1+ MR, IRA, , no significant pulm HTN.      Past Medical History:   Past Medical History:   Diagnosis Date    Anxiety     Arthritis     Diastolic dysfunction 13/43/9038    CARDIOLOGY/PULMONARY CONSULTS, 7/2018    Dizziness and giddiness 2/8/2021    Hypertension     Hypothyroidism     Neuropathy     PERIPHERAL    Osteoarthritis     Sleep apnea     RECENT ADJUSTMENT OF CPAP    Tinnitus of right ear 2/8/2021    Urinary incontinence     Vestibulopathy 2/8/2021       Review of Systems:   Constitutional: No fever, chills, sweats  Cardiac: As per HPI  Pulmonary: No cough, wheeze, hemoptysis  HEENT: No visual disturbances, difficult swallowing  GI: No nausea, vomiting, diarrhea, abdominal pain, rectal bleeding  : No dysuria or hematuria  Endocrine: No excessive thirst, heat or cold intolerance. Musculoskeletal: No joint pain or muscle aches. No claudication  Skin: No skin breakdown or rashes  Neuro: No headache, confusion, or seizures+ dizziness  Psych: No depression, anxiety    Family History:  Family History   Problem Relation Age of Onset    No Known Problems Mother     Cancer Father         larynx    Heart Disease Father     Coronary Art Dis Father     Other Brother         pulmonary fibrosis    Heart Attack Brother     Coronary Art Dis Sister     Colon Cancer Maternal Grandmother        Social History:  Social History     Socioeconomic History    Marital status:      Spouse name: Not on file    Number of children: Not on file    Years of education: Not on file    Highest education level: Not on file   Occupational History    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    Tobacco Use    Smoking status: Never Smoker    Smokeless tobacco: Never Used   Substance and Sexual Activity    Alcohol use: Yes     Comment: Socially    Drug use: No    Sexual activity: Not on file   Other Topics Concern    Not on file   Social History Narrative    Not on file     Social Determinants of Health     Financial Resource Strain:     Difficulty of Paying Living Expenses: Not on file   Food Insecurity:     Worried About [address] in the Last Year: Not on file    [address] in the Last Year: Not on file   Transportation Needs:     Lack of Transportation (Medical): Not on file    Lack of Transportation (Non-Medical):  Not on file   Physical Activity:     Days of Exercise per Week: Not on file    Minutes of Exercise per Session: Not on file   Stress:     Feeling of Stress : Not on file   Social Connections:     Frequency of Communication with Friends and Family: Not on file    Frequency of Social Gatherings with Friends and Family: Not on file    Attends [employer] Services: Not on file    Active Member of Clubs or Organizations: Not on file    Attends Club or Organization Meetings: Not on file    Marital Status: Not on file   Intimate Partner Violence:     Fear of Current or Ex-Partner: Not on file    Emotionally Abused: Not on file    Physically Abused: Not on file    Sexually Abused: Not on file   Housing Stability:     Unable to Pay for Housing in the Last Year: Not on file    Number of [place] in the Last Year: Not on file    Unstable Housing in the Last Year: Not on file       Allergies: Allergies   Allergen Reactions    Fesoterodine Rash       Home Medications:  Prior to Admission medications    Medication Sig Start Date End Date Taking?  Authorizing Provider   nitrofurantoin, macrocrystal-monohydrate, (MACROBID) 100 MG capsule TAKE ONE CAPSULE BY MOUTH TWO TIMES A DAY 11/10/21   Historical Provider, MD   zolpidem (AMBIEN) 10 MG tablet TAKE ONE TABLET BY MOUTH EVERY DAY AT BEDTIME 12/20/21   Historical Provider, MD   fluticasone (FLONASE) 50 MCG/ACT nasal spray 2 sprays by Nasal route daily 1/3/22   Yakov Meza MD   labetalol (NORMODYNE) 100 MG tablet TAKE ONE TABLET BY MOUTH TWO TIMES A DAY 12/22/21   Yakov Meza MD   DULoxetine (CYMBALTA) 60 MG extended release capsule TAKE ONE CAPSULE BY MOUTH EVERY DAY 12/22/21   Yakov Meza MD   amLODIPine (NORVASC) 10 MG tablet TAKE ONE TABLET BY MOUTH DAILY 12/22/21   Yakov Meza MD   levothyroxine (SYNTHROID) 75 MCG tablet One po daily 12/22/21   Yakov Meza MD   pantoprazole (PROTONIX) 40 MG tablet TAKE ONE TABLET BY MOUTH DAILY 12/22/21   Yakov Meza MD   rosuvastatin (CRESTOR) 20 MG tablet Take 1 tablet by mouth daily 12/22/21   Yakov Meza MD   bumetanide (BUMEX) 2 MG tablet Take 1 tablet by mouth daily 12/22/21   Yakov Meza MD   LORazepam (ATIVAN) 0.5 MG tablet TAKE ONE TABLET BY MOUTH EVERY 8 HOURS as needed for anxiety 12/13/21 3/13/22  Yakov Meza MD   furosemide (LASIX) 20 MG tablet TAKE TWO TABLETS BY MOUTH DAILY 8/23/21   Ivanna Turcios MD   clotrimazole (MYCELEX) 10 MG ritika Take 10 mg by mouth 5 times daily    Historical Provider, MD   Multiple Vitamin (MVI, CELEBRATE, CHEWABLE TABLET) Take one(1) tablet daily.  11/10/03   Historical Provider, MD   vitamin C (ASCORBIC ACID) 500 MG tablet Take by mouth 11/30/05   Historical Provider, MD   potassium chloride (KLOR-CON) 20 MEQ packet Take 40 mEq by mouth daily Only when taking water pills    Historical Provider, MD       Current Medications:  Current Facility-Administered Medications   Medication Dose Route Frequency Provider Last Rate Last Admin    sodium chloride flush 0.9 % injection 5-40 mL  5-40 mL IntraVENous 2 times per day KENNEDY Finch NP   10 mL at 02/01/22 2317    sodium chloride flush 0.9 % injection 5-40 mL  5-40 mL IntraVENous PRN KENNEDY Finch - NP        0.9 % sodium chloride infusion  25 mL IntraVENous PRN KENNEDY Finch - RENEE        promethazine (PHENERGAN) tablet 12.5 mg  12.5 mg Oral Q6H PRN KENNEDY Finch NP        Or    ondansetron (ZOFRAN) injection 4 mg  4 mg IntraVENous Q6H PRN KENNEDY Finch - NP        senna (SENOKOT) tablet 8.6 mg  1 tablet Oral Daily PRN KENNEDY Finch - NP        acetaminophen (TYLENOL) tablet 650 mg  650 mg Oral Q6H PRN KENNEDY Finch - RENEE        Or   Benji Carbon acetaminophen (TYLENOL) suppository 650 mg  650 mg Rectal Q6H PRN KENNEDY Finch - RENEE        amLODIPine (NORVASC) tablet 10 mg  10 mg Oral Daily KENNEDY Finch - NP        clotrimazole [facility]) ritika 10 mg  10 mg Oral 5x Daily KENNEDY Finch - NP   10 mg at 02/02/22 5948    DULoxetine (CYMBALTA) extended release capsule 60 mg  60 mg Oral Daily KENNEDY Finch NP        fluticasone (FLONASE) 50 MCG/ACT nasal spray 2 spray  2 spray Nasal Daily KENNEDY Finch NP        levothyroxine (SYNTHROID) tablet 75 mcg  75 mcg Oral Daily KENNEDY Tuttle NP   75 mcg at 02/02/22 7886    LORazepam (ATIVAN) tablet 0.5 mg  0.5 mg Oral Q6H PRN KENNEDY Tuttle - NP        rosuvastatin (CRESTOR) tablet 20 mg  20 mg Oral Daily KENNEDY Tuttle - NP        enoxaparin (LOVENOX) injection 30 mg  30 mg SubCUTAneous BID KENNEDY Tuttle NP   30 mg at 02/01/22 2315    zolpidem (AMBIEN) tablet 5 mg  5 mg Oral Nightly PRN Gabriel Walton DO   5 mg at 02/01/22 2315      sodium chloride         Physical Exam:  BP (!) 145/65   Pulse 56   Temp 98.5 °F (36.9 °C) (Oral)   Resp 16   Ht 5' 6\" (1.676 m)   Wt 226 lb 6.4 oz (102.7 kg)   SpO2 97%   BMI 36.54 kg/m²   Weight change: Wt Readings from Last 3 Encounters:   02/02/22 226 lb 6.4 oz (102.7 kg)   02/01/22 225 lb (102.1 kg)   12/22/21 224 lb (101.6 kg)     General: Awake, alert, oriented x3, no acute distress  HEENT: Normocephalic and atraumatic, extraocular movements intact, pupils equal and round, moist mucus membranes, sclera anicteric  Neck: No JVD, no carotid bruits, no thyromegaly, no adenopathy  Cardiac: Regular rate and rhythm, normal S1 and physiologically split S2, no S3, no S4. Apical impulse is nondisplaced. No murmurs, no pericardial rubs, no clicks. Carotid upstrokes brisk. Respiratory: Clear bilaterally; no wheezes, no rales, no rhonchi. Unlabored respirations  Abdomen: Soft, nontender, nondistended, bowel sounds+, no hepatomegaly, no masses, no abdominal bruits  Extremities: No edema, no cyanosis, no clubbing. Distal pulses intact  Skin: Intact, warm and dry, no rashes, no breakdown  Musculoskeletal: normal tone and strength in the upper and lower extremities bilaterally  Neuro: No focal deficits. Moves all extremities appropriately to command.   Normal sensation in the upper and lower extremities bilaterally  Psychiatric: Cooperative, and normal affect    Intake/Output:  No intake or output data in the 24 hours ending 02/02/22 0908  No intake/output data recorded. Laboratory Tests:  Last 3 CBC:  Recent Labs     02/01/22 2121 02/02/22 0332   WBC 8.6 7.9   RBC 3.62 3.31*   HGB 11.4* 10.4*   HCT 34.5 31.4*   MCV 95.3 94.9   MCH 31.5 31.4   MCHC 33.0 33.1   RDW 14.6 14.6    299   MPV 9.7 9.6       Last 3 CMP:    Recent Labs     02/01/22 1853 02/02/22 0332    133   K 3.9 3.7   CL 98 101   CO2 22 22   BUN 13 14   CREATININE 1.1* 1.0   GLUCOSE 106* 110*   CALCIUM 9.5 8.8   PROT 7.2 6.4   LABALBU 3.8 3.5   BILITOT 0.3 0.3   ALKPHOS 89 79   AST 19 16   ALT 10 8       Last 3 Mag/Phos:  No results for input(s): MG, PHOS in the last 72 hours. Last 3 CK, CKMB, Troponin  No results for input(s): CKTOTAL, CKMB, TROPONINI in the last 72 hours. Last 3 Pro-BNP:  No results for input(s): PROBNP in the last 72 hours. Lab Results   Component Value Date    PROBNP 333 12/22/2021       Last 3 Glucose:     Recent Labs     02/01/22 1853 02/02/22 0332   GLUCOSE 106* 110*       Last 3 Coags:  Recent Labs     02/01/22 1853   PROTIME 12.7*   INR 1.1     Lab Results   Component Value Date    PROTIME 12.7 02/01/2022    INR 1.1 02/01/2022       Last 3 Lipid Panel:  No results for input(s): LDLCALC, HDL, TRIG in the last 72 hours. Invalid input(s): CHLPL  Lab Results   Component Value Date    LDLCALC 53 06/22/2021    LDLCALC 58 12/18/2020    LDLCALC 108 (H) 06/16/2020     Lab Results   Component Value Date    HDL 62 06/22/2021    HDL 65 12/18/2020    HDL 61 08/21/2020     Lab Results   Component Value Date    TRIG 64 06/22/2021    TRIG 100 12/18/2020    TRIG 71 08/21/2020     No components found for: CHLPL    TSH:  Recent Labs     02/02/22 0332   TSH 2.320     Lab Results   Component Value Date    TSH 2.320 02/02/2022       ABGs:  No results for input(s): PH, PO2, PCO2, HCO3, BE, O2SAT in the last 72 hours. Lactic Acid:  Recent Labs     02/01/22  1853   LACTA 1.2         Radiology:  RAD Results:  XR CHEST (2 VW)   Final Result   No acute process.       Hiatal hernia. EKG and Telemetry:  12-lead EKG personally reviewed and shows sinus rhythm 62, nonspecific ST -T wave abnormality    Telemetry personally reviewed and shows sinus bradycardia, 45-50's no pauses        ASSESSMENT / PLAN:    1. Syncope- 10 days ago after getting out of bed.may have been orthostatic then , but not here and now. A drop in bp could be associated with the labetolol as its half alpha blocker. No evidence so far that she has any heart block, all sinus rhythm. Can try to cut labetolol in half (would not abruptly stop so we avoid rebound effects.) can f/u in office for a monitor after she's been on the lower dose for at least a couple weeks. 2 Dizziness sounds noncardiogenic- not really new. 3 HTN has historically been tough to control- continue norvasc, for home cut labetolol in half 50 mg bid( pills not in our formulary). History of med intolerances. 4 GIOVANI- continue CPAP  5 Hyperlipidemia-continue crestor. 6 Pulm HTN only mild on her w/u with [facility]        Thank you for consultation.     Kenny Licona MD, MD, [address] [place] [address] at [address] [place] [address] Way    Electronically signed by Kenny Licona MD on 2/2/2022 at 9:08 AM

## 2022-02-02 NOTE — PLAN OF CARE
Problem: Falls - Risk of:  Goal: Will remain free from falls  Description: Will remain free from falls  2/2/2022 0940 by Pipe Baumann RN  Outcome: Met This Shift     Problem: Falls - Risk of:  Goal: Absence of physical injury  Description: Absence of physical injury  2/2/2022 0940 by Pipe Baumann RN  Outcome: Met This Shift

## 2022-02-02 NOTE — H&P
Internal Medicine History & Physical     Name: Kimberly Goncalves  : 1945  Chief Complaint: Loss of Consciousness (bradycardic earlier today with ekg changes )  Primary Care Physician: Jared Gleason MD  Admission date: 2022  Date of service: 2022     History of Present Illness  Sudhakar Mack is a 64y.o. year old female. Patient presents for syncope and bradycardia. This is a pended note. I will make adjustments and complete this note after I see the patient. Patient was at physically therapy which she receives due to her frequent dizziness/lightheadedness and syncope. The patient was found to have a heart rate of 48 and was sent to urgent care. Patient describes her dizziness as worsening when she moves her head, especially to the right. She has been fatigued and at least one episode of syncope within the last week. All her symptoms have been going on for about a week now. There are no other modifying factors or associated symptoms. Patient is not currently dizzy or lightheaded. She is not complaining of any chest pain, shortness of breath or headache. There are no family or friends at bedside. The history is provided by the patient. She is felt to be a good historian. ED course:   Initial blood work and imaging studies performed. Admission recommended by ED physician.  Meds in ED consisted of the following: none    Past Medical History:   Diagnosis Date    Anxiety     Arthritis     Diastolic dysfunction     CARDIOLOGY/PULMONARY CONSULTS, 2018    Dizziness and giddiness 2021    Hypertension     Hypothyroidism     Neuropathy     PERIPHERAL    Osteoarthritis     Sleep apnea     RECENT ADJUSTMENT OF CPAP    Tinnitus of right ear 2021    Urinary incontinence     Vestibulopathy 2021       Past Surgical History:   Procedure Laterality Date    APPENDECTOMY      BLADDER SURGERY      CARDIAC CATHETERIZATION Right 2018    CATARACT REMOVAL      CHOLECYSTECTOMY  HYSTERECTOMY      OTHER SURGICAL HISTORY      multiple urologic surgeries including stim       Family Medical History:  Family History   Problem Relation Age of Onset    No Known Problems Mother     Cancer Father         larynx    Heart Disease Father     Coronary Art Dis Father     Other Brother         pulmonary fibrosis    Heart Attack Brother     Coronary Art Dis Sister     Colon Cancer Maternal Grandmother        Social History  Patient lives at home. Employment: retired  Illicit drug use- denies  TOBACCO:   reports that she has never smoked. She has never used smokeless tobacco.  ETOH:   reports current alcohol use. Home Medications  Prior to Admission medications    Medication Sig Start Date End Date Taking?  Authorizing Provider   nitrofurantoin, macrocrystal-monohydrate, (MACROBID) 100 MG capsule TAKE ONE CAPSULE BY MOUTH TWO TIMES A DAY 11/10/21   Historical Provider, MD   zolpidem (AMBIEN) 10 MG tablet TAKE ONE TABLET BY MOUTH EVERY DAY AT BEDTIME 12/20/21   Historical Provider, MD   fluticasone (FLONASE) 50 MCG/ACT nasal spray 2 sprays by Nasal route daily 1/3/22   Farhad Gonzalez MD   labetalol (NORMODYNE) 100 MG tablet TAKE ONE TABLET BY MOUTH TWO TIMES A DAY 12/22/21   Farhad Gonzalez MD   DULoxetine (CYMBALTA) 60 MG extended release capsule TAKE ONE CAPSULE BY MOUTH EVERY DAY 12/22/21   Farhad Gonzalez MD   amLODIPine (NORVASC) 10 MG tablet TAKE ONE TABLET BY MOUTH DAILY 12/22/21   Farhad Gonzalez MD   levothyroxine (SYNTHROID) 75 MCG tablet One po daily 12/22/21   Farhad Gonzalez MD   pantoprazole (PROTONIX) 40 MG tablet TAKE ONE TABLET BY MOUTH DAILY 12/22/21   Farhad Gonzalez MD   rosuvastatin (CRESTOR) 20 MG tablet Take 1 tablet by mouth daily 12/22/21   Farhad Gonzalez MD   bumetanide (BUMEX) 2 MG tablet Take 1 tablet by mouth daily 12/22/21   Farhad Gonzalez MD   LORazepam (ATIVAN) 0.5 MG tablet TAKE ONE TABLET BY MOUTH EVERY 8 HOURS as needed for anxiety 12/13/21 3/13/22  Ernst Blum Leslie Lozano MD   furosemide (LASIX) 20 MG tablet TAKE TWO TABLETS BY MOUTH DAILY 8/23/21   Sayda Sanchez MD   clotrimazole (MYCELEX) 10 MG ritika Take 10 mg by mouth 5 times daily    Historical Provider, MD   Multiple Vitamin (MVI, CELEBRATE, CHEWABLE TABLET) Take one(1) tablet daily. 11/10/03   Historical Provider, MD   vitamin C (ASCORBIC ACID) 500 MG tablet Take by mouth 11/30/05   Historical Provider, MD   potassium chloride (KLOR-CON) 20 MEQ packet Take 40 mEq by mouth daily Only when taking water pills    Historical Provider, MD       Allergies  Allergies   Allergen Reactions    Fesoterodine Rash       Review of Systems:   Please see HPI above. All bolded are positive. All un-bolded are negative.   Constitutional Symptoms: fever, chills, fatigue, generalized weakness, diaphoresis, increase in thirst, loss of appetite  Eyes: vision change   Ears, Nose, Mouth, Throat: hearing loss, nasal congestion, sores in the mouth  Cardiovascular: chest pain, chest heaviness, palpitations  Respiratory: shortness of breath, wheezing, coughing  Gastrointestinal: abdominal pain, nausea, vomiting, diarrhea, constipation, melena, hematochezia, hematemesis  Genitourinary: dysuria, hematuria, increased frequency  Musculoskeletal: lower extremity edema, myalgias, arthralgias, back pain  Integumentary: rashes, itching   Neurological: headache, lightheadedness, dizziness, confusion, syncope, numbness, tingling, focal weakness  Psychiatric: depression, suicidal ideation, anxiety  Endocrine: unintentional weight change  Hematologic/Lymphatic: lymphadenopathy, easy bruising, easy bleeding   Allergic/Immunologic: recurrent infections      Objective  VITALS:  BP (!) 158/69   Pulse 62   Temp 97.4 °F (36.3 °C) (Oral)   Resp 16   Ht 5' 6\" (1.676 m)   Wt 226 lb 6.4 oz (102.7 kg)   SpO2 99%   BMI 36.54 kg/m²     Physical Exam:   General: awake, alert, oriented to person, place, time, and purpose, appears stated age, cooperative, no acute distress, pleasant, appropriate mood, obese  Eyes: conjunctivae/corneas clear, sclera non icteric, EOMI  Ears: no obvious scars, no lesions, no masses, hearing intact  Mouth: mucous membranes moist, no obvious oral sores  Head: normocephalic, atraumatic  Neck: no JVD, no adenopathy, no thyromegaly, neck is supple, trachea is midline  Back: ROM normal, no CVA tenderness. Chest: no pain on palpation  Lungs: clear to auscultation bilaterally, without rhonchi, crackle, wheezing, or rale, no retractions or use of accessory muscles  Heart: bradycardic rate and regular rhythm, systolic murmur, normal S1, S2  Abdomen: soft, non-tender; bowel sounds normal; no masses, no organomegaly  : Deferred   Extremities: 1-2+ lower extremity edema, extremities atraumatic, no cyanosis, no clubbing, 2+ pedal pulses palpated  Skin: normal color, normal texture, normal turgor, no rashes, no lesions  Neurologic:5/5 muscle strength throughout, normal muscle tone throughout, face symmetric, hearing intact, tongue midline, speech appropriate without slurring, sensation to fine touch intact in upper and lower extremities. Labs-   Lab Results   Component Value Date    WBC 7.9 02/02/2022    HGB 10.4 (L) 02/02/2022    HCT 31.4 (L) 02/02/2022     02/02/2022     02/02/2022    K 3.7 02/02/2022     02/02/2022    CREATININE 1.0 02/02/2022    BUN 14 02/02/2022    CO2 22 02/02/2022    GLUCOSE 110 (H) 02/02/2022    ALT 8 02/02/2022    AST 16 02/02/2022    INR 1.1 02/01/2022     Lab Results   Component Value Date    CKTOTAL 52 08/21/2020    CKMB 1.7 08/21/2020    TROPONINI < 0.03 08/21/2020       Recent Radiological Studies:  XR CHEST (2 VW)   Final Result   No acute process. Hiatal hernia.              Assessment  Active Hospital Problems    Diagnosis     Syncope and collapse [R55]      Priority: High    Dizziness [R42]      Priority: Medium    Morbidly obese (HCC) [E66.01]     Anxiety [F41.9]     Lumbar radiculopathy [M54.16]     Gastroesophageal reflux disease without esophagitis [K21.9]     Hypothyroidism [E03.9]     Sleep apnea [G47.30]     Essential hypertension [I10]     Chronic fatigue [R53.82]     Primary insomnia [F51.01]        Patient Active Problem List    Diagnosis Date Noted    Syncope and collapse 02/01/2022     Priority: High    Dizziness 02/08/2021     Priority: Medium    Morbidly obese ([address] 12/18/2020    Anxiety 06/16/2020    Lumbar radiculopathy 12/17/2019    Gastroesophageal reflux disease without esophagitis 12/17/2019    Hypothyroidism 12/17/2019    Sleep apnea 10/15/2019    Essential hypertension 10/15/2019    Chronic fatigue 10/15/2019    Primary insomnia 10/15/2019       Plan  · Syncope w/ EKG chnages:   · Observation. Telemetry. · Orthostatic BPs. · Adjust blood pressure meds per cardiology. Suggested decrease labetalol   · Cardiology consult appreciated. · ANTONIO hose. · Cycle troponins  · Elevated ddimer-- consider CTA chest  · Continue home medications  · PT/OT  · Follow labs  · DVT prophylaxis. · Please see orders for further management and care. ·  for discharge planning  · Discharge plan: home today     Antoni Landeros MD  2/2/2022  11:39 AM    I can be reached through [employer]. NOTE:  This report was transcribed using voice recognition software. Every effort was made to ensure accuracy; however, inadvertent computerized transcription errors may be present. Addendum: I have personally participated in a face-to-face history and physical exam on the date of service with the patient. I have discussed the case with the resident. I also participated in medical decision making with the resident on the date of service and I agree with all of the pertinent clinical information unless indicated in my editing of the note. I have reviewed and edited the note above based on my findings during my history, exam, and decision making on the same day of service.      My additional thoughts:    Cardiology input appreciated-BP/rate control medications adjusted-- Labetalol dose cut in half   Case discussed with cardio-- ok for DC from their POV    Electronically signed by Patricia Paulson DO on 2/2/2022 at 11:49 AM    I can be reached through [email or web address].

## 2022-02-02 NOTE — PROGRESS NOTES
Physical Therapy    Facility/Department: [address] INTERMEDIATE 1  Initial Assessment    NAME: Stanford Looney  : 1945  MRN: [id number]    Date of Service: 2022      Patient Diagnosis(es): The encounter diagnosis was Syncope and collapse.     has a past medical history of Anxiety, Arthritis, Diastolic dysfunction, Dizziness and giddiness, Hypertension, Hypothyroidism, Neuropathy, Osteoarthritis, Sleep apnea, Tinnitus of right ear, Urinary incontinence, and Vestibulopathy. has a past surgical history that includes Bladder surgery; Cholecystectomy; Appendectomy; Hysterectomy; Cardiac catheterization (Right, 2018); Cataract removal; and other surgical history. Evaluating Therapist: Padmini Mar PT    Room #:  5667/2009-W  Diagnosis:  Syncope and collapse [R55]  PMHx/PSHx:  HTn, OA  Precautions:  falls      Social:  Pt lives alone in a 1 floor plan 1 stepsto enter. Prior to admission Pt typically independent with cane. Recently started using rollator     Initial Evaluation  Date: 22 Treatment      Short Term/ Long Term   Goals   Was pt agreeable to Eval/treatment? yes     Does pt have pain? No c/o pain     Bed Mobility  Rolling: SBA  Supine to sit: SBA  Sit to supine: SBA  Scooting: SBA  independnet   Transfers Sit to stand: min assist  Stand to sit: min assist  Stand pivot: min assist  independent   Ambulation    25 feet x2 with rollator with CGA  50 feet with rollator independent   Stair Negotiation  Ascended and descended  NT      LE strength     3+/5    4-/5   balance      fair     AM-PAC Raw score               17/24         Pt is alert and Oriented   LE ROM: WFL  Sensation: intact  Edema: none  Endurance: fair     ASSESSMENT:    Pt displays functional ability as noted in the objective portion of this evaluation.       Patient education  Pt educated on PT objectives    Patient response to education:   Pt verbalized understanding Pt demonstrated skill Pt requires further education in this area yes           Comments:  Pt with difficulty with sit to stand transfer. Min assist to stand. Pt incontinent of urine upon standing. Pt short of breath and fatigued with mobility. Cues for walker safety. Conditions Requiring Skilled Therapeutic Intervention:    [x]Decreased strength     []Decreased ROM  [x]Decreased functional mobility  [x]Decreased balance   [x]Decreased endurance   []Decreased posture  []Decreased sensation  []Decreased coordination   []Decreased vision  []Decreased safety awareness   []Increased pain       Patient and or family understand(s) diagnosis, prognosis, and plan of care. Prognosis is good for reaching above PT goals    PHYSICAL THERAPY PLAN OF CARE:    PT POC is established based on physician order and patient diagnosis     Referring provider/PT Order: KENNEDY Edward - NP/ PT eval and treat      Current Treatment Recommendations:     [x] Strengthening to improve independence with functional mobility   [] ROM to improve independence with functional mobility   [x] Balance Training to improve static/dynamic balance and to reduce fall risk  [x] Endurance Training to improve activity tolerance during functional mobility   [x] Transfer Training to improve safety and independence with all functional transfers   [x] Gait Training to improve gait mechanics, endurance and assess need for appropriate assistive device  [] Stair Training in preparation for safe discharge home and/or into the community   [] Positioning to prevent skin breakdown and contractures  [x] Safety and Education Training   [x] Patient/Caregiver Education   [] HEP  [] Other     PT long term treatment goals are located in above grid    Frequency of treatments: 2-5x/week x 5 days.     Time in  1030  Time out  1045        Evaluation Time includes thorough review of current medical information, gathering information on past medical history/social history and prior level of function, completion of standardized testing/informal observation of tasks, assessment of data and education on plan of care and goals.       CPT codes:  [x] Low Complexity PT evaluation 26025  [] Moderate Complexity PT evaluation 82013  [] High Complexity PT evaluation 74028  [] PT Re-evaluation 95816  [] Gait training 72633 minutes  [] Manual therapy 86657 minutes  [] Therapeutic activities 18480 minutes  [] Therapeutic exercises 27246 minutes  [] Neuromuscular reeducation 05987 minutes     Orly PT [id number]

## 2022-02-03 NOTE — DISCHARGE SUMMARY
The patient was discharged on the same day as history and physical.  Please see history and physical as well as imaging studies, consult and evaluations, and nurse's notes.     Electronically signed by Elva Dumas DO on 2/3/2022 at 6:59 AM

## 2022-02-15 DIAGNOSIS — F41.9 ANXIETY: ICD-10-CM

## 2022-02-15 RX ORDER — LORAZEPAM 0.5 MG/1
TABLET ORAL
Qty: 90 TABLET | Refills: 0 | Status: SHIPPED
Start: 2022-02-15 | End: 2022-04-30

## 2022-02-22 DIAGNOSIS — F51.01 PRIMARY INSOMNIA: Primary | ICD-10-CM

## 2022-02-23 RX ORDER — ZOLPIDEM TARTRATE 10 MG/1
TABLET ORAL
Qty: 30 TABLET | Refills: 0 | Status: SHIPPED
Start: 2022-02-23 | End: 2022-04-21

## 2022-02-23 NOTE — TELEPHONE ENCOUNTER
Last Appointment:  12/22/2021  Future Appointments   Date Time Provider Lorri Sequeira   3/8/2022  4:00 PM [facility]   8/15/2022 10:30 AM DO Marcella Manjarrez [facility]

## 2022-03-08 ENCOUNTER — OFFICE VISIT (OUTPATIENT)
Dept: FAMILY MEDICINE CLINIC | Age: 77
End: 2022-03-08
Payer: MEDICARE

## 2022-03-08 VITALS
TEMPERATURE: 98.4 F | OXYGEN SATURATION: 98 % | SYSTOLIC BLOOD PRESSURE: 160 MMHG | HEART RATE: 69 BPM | WEIGHT: 228 LBS | DIASTOLIC BLOOD PRESSURE: 80 MMHG | BODY MASS INDEX: 36.8 KG/M2

## 2022-03-08 DIAGNOSIS — D64.9 ANEMIA, UNSPECIFIED TYPE: ICD-10-CM

## 2022-03-08 DIAGNOSIS — E66.01 MORBIDLY OBESE (HCC): ICD-10-CM

## 2022-03-08 DIAGNOSIS — M81.0 AGE RELATED OSTEOPOROSIS, UNSPECIFIED PATHOLOGICAL FRACTURE PRESENCE: ICD-10-CM

## 2022-03-08 DIAGNOSIS — F41.9 ANXIETY: ICD-10-CM

## 2022-03-08 DIAGNOSIS — K21.9 GASTROESOPHAGEAL REFLUX DISEASE WITHOUT ESOPHAGITIS: ICD-10-CM

## 2022-03-08 DIAGNOSIS — I10 ESSENTIAL HYPERTENSION: Primary | ICD-10-CM

## 2022-03-08 DIAGNOSIS — F51.01 PRIMARY INSOMNIA: ICD-10-CM

## 2022-03-08 DIAGNOSIS — E03.9 ACQUIRED HYPOTHYROIDISM: ICD-10-CM

## 2022-03-08 PROCEDURE — 99214 OFFICE O/P EST MOD 30 MIN: CPT | Performed by: INTERNAL MEDICINE

## 2022-03-08 RX ORDER — ALENDRONATE SODIUM 70 MG/1
70 TABLET ORAL
Qty: 12 TABLET | Refills: 1 | Status: SHIPPED | OUTPATIENT
Start: 2022-03-08

## 2022-03-08 RX ORDER — LEVOTHYROXINE SODIUM 0.07 MG/1
TABLET ORAL
Qty: 90 TABLET | Refills: 1 | Status: SHIPPED
Start: 2022-03-08 | End: 2022-09-13 | Stop reason: SDUPTHER

## 2022-03-08 NOTE — PROGRESS NOTES
.  HPI: Patient was recently hospitalized with syncope. This is felt to be orthostatic in nature and her blood pressure medication namely the labetalol was decreased. Since that time her symptoms have improved. The other part of her dizziness relates to benign paroxysmal positional vertigo. She currently is in physical therapy for this and I have urged her to go back. She is complaining of lethargy. She states she lays around all the time. She would like her antidepressant increased but she is really not describing depression. I did offer counseling to see if this would motivate her. She does have interaction with her grandchildren. I told her that we may have to tolerate a little bit higher blood pressures to avoid orthostatic type symptoms. Patient denies any cardiac or respiratory symptoms including orthopnea or PND. She does have a fair amount of arthritic complaints. Allergies: NKDA  PMH:  Problem List: Essential hypertension  Health Maintenance:  Influenza Vaccination - (2020)  Mammogram -   Colonoscopy - 2019 diverticular dx  Medical Problems:  Hypothyroidism, Hypertension, Anxiety, Osteoarthritis  Pulmonary HTN - (2018) [facility]-RIGHT SIDED HEART CATH  Incontinence, Peripheral Neuropathy  Diastolic Dysfunction - () CARDIOLOGY/PULMONARY CONSULTS   Sleep Apnea - (10/15/2019) RECENT ADJUSTMENT OF CPAP  Dizziness- Paul/ Rosemary   Syncope- hospital 2022-resolved symptoms with decrease of Labetalol  Osteoporosis- began Fosamax 3/8/2022  Surgical Hx:  Cataract Removal, Multiple urologic surgeries including stim, Removal of Gallbladder, Appendectomy,  Partial Hysterectomy  Reviewed, no changes. FH:  Father:  . (Hx)  due to CAD; Throat Cancer. Mother:  . (Hx)  due to Old age ? unknown. Brother 1:  . (Hx)  due to Pulmonary fibrosis. Brother 2:  . (Hx)  due to MI - age 47. Sister 1:  Coronary Artery Disease (CAD). Maternal Grandmother:  . (Hx)  due to Colon Cancer. Reviewed, no changes. SH:  Marital: Legal Status: . Personal Habits: Cigarette Use: Never Smoked Cigarettes. Alcohol: Social Very Rare. Drug Use:  Denies Drug Use. Reviewed, no changes. Date: 2021  Was the patient queried about smoking behavior? Yes   Does the patient currently smoke? Smoking: Nonsmoker. Objective  Vitals:    22 1628   BP: (!) 160/80   Pulse: 69   Temp: 98.4 °F (36.9 °C)   SpO2: 98%   Exam:  Const: Appears healthy, well developed and well nourished. Appears  obese. Eyes: EOMI in both eyes. PERRL. ENMT: External ears WNL. Tympanic membranes are intact. External nose WNL. Neck: Supple and symmetric. Palpation reveals no adenopathy. No masses appreciated. Thyroid:  no nodule appreciated or thyromegaly. No jugular venous distention. Resp: Respirations are unlabored. Respiration rate is normal. Auscultate good airflow. No rales,  rhonchi or wheezes appreciated over the lungs bilaterally. CV: Rhythm is regular. S1 is normal. S2 is accentuated. S4 is audible. Carotids: no bruits. Abdominal aorta: not  palpable. Pedal pulses: 2+ and equal bilaterally. Extremities: No clubbing, cyanosis or edema. Abdomen: Bowel sounds are normoactive. Palpation reveals softness, with no distension,  organomegaly or tenderness. No abdominal masses palpable. No palpable hepatosplenomegaly. Musculo: Walks with a wide-based gait. Upper Extremities: ROM: Full ROM bilaterally. Lower  Extremities: ROM: Full ROM bilaterally. Skin: Dry and warm with no rash. Neuro: Alert and oriented x3. Mood is normal. Affect is normal. Speech is articulate and fluent. DTR's are symmetric, intact and 2+ bilaterally. Sudhakar Mack was seen today for other. Diagnoses and all orders for this visit:    Essential hypertension  -     Comprehensive Metabolic Panel; Future    Acquired hypothyroidism  -     levothyroxine (SYNTHROID) 75 MCG tablet;  One po daily    Gastroesophageal reflux disease without esophagitis    Morbidly obese (HCC)    Anxiety    Primary insomnia    Anemia, unspecified type  -     CBC with Auto Differential; Future    Age related osteoporosis, unspecified pathological fracture presence  -     Vitamin D 25 Hydroxy; Future    Other orders  -     alendronate (FOSAMAX) 70 MG tablet; Take 1 tablet by mouth every 7 days    The patient is encouraged to for more activity. I told her there will be limitations in terms of her ability to physically do things. We will keep her blood pressure medication as is as it has relieved her symptomatology. She is to go to physical therapy for benign paroxysmal positional vertigo. We had a long discussion today explaining the mechanism of antiresorptive therapies. I explained the side effects of these medications and gave her printed instructions. She is to take in adequate amounts of calcium and vitamin D3 and these are specified. I will see her back in 6 months.

## 2022-03-10 DIAGNOSIS — M81.0 AGE RELATED OSTEOPOROSIS, UNSPECIFIED PATHOLOGICAL FRACTURE PRESENCE: ICD-10-CM

## 2022-03-10 DIAGNOSIS — I10 ESSENTIAL HYPERTENSION: ICD-10-CM

## 2022-03-10 DIAGNOSIS — D64.9 ANEMIA, UNSPECIFIED TYPE: ICD-10-CM

## 2022-03-10 LAB
ALBUMIN SERPL-MCNC: 4.3 G/DL (ref 3.5–5.2)
ALP BLD-CCNC: 98 U/L (ref 35–104)
ALT SERPL-CCNC: 11 U/L (ref 0–32)
ANION GAP SERPL CALCULATED.3IONS-SCNC: 10 MMOL/L (ref 7–16)
AST SERPL-CCNC: 22 U/L (ref 0–31)
BASOPHILS ABSOLUTE: 0.1 E9/L (ref 0–0.2)
BASOPHILS RELATIVE PERCENT: 1.2 % (ref 0–2)
BILIRUB SERPL-MCNC: 0.4 MG/DL (ref 0–1.2)
BUN BLDV-MCNC: 9 MG/DL (ref 6–23)
CALCIUM SERPL-MCNC: 9.5 MG/DL (ref 8.6–10.2)
CHLORIDE BLD-SCNC: 99 MMOL/L (ref 98–107)
CO2: 26 MMOL/L (ref 22–29)
CREAT SERPL-MCNC: 1.1 MG/DL (ref 0.5–1)
EOSINOPHILS ABSOLUTE: 0.39 E9/L (ref 0.05–0.5)
EOSINOPHILS RELATIVE PERCENT: 4.6 % (ref 0–6)
GFR AFRICAN AMERICAN: 58
GFR NON-AFRICAN AMERICAN: 48 ML/MIN/1.73
GLUCOSE BLD-MCNC: 114 MG/DL (ref 74–99)
HCT VFR BLD CALC: 37.8 % (ref 34–48)
HEMOGLOBIN: 12.1 G/DL (ref 11.5–15.5)
IMMATURE GRANULOCYTES #: 0.02 E9/L
IMMATURE GRANULOCYTES %: 0.2 % (ref 0–5)
LYMPHOCYTES ABSOLUTE: 2.11 E9/L (ref 1.5–4)
LYMPHOCYTES RELATIVE PERCENT: 24.7 % (ref 20–42)
MCH RBC QN AUTO: 31.2 PG (ref 26–35)
MCHC RBC AUTO-ENTMCNC: 32 % (ref 32–34.5)
MCV RBC AUTO: 97.4 FL (ref 80–99.9)
MONOCYTES ABSOLUTE: 0.88 E9/L (ref 0.1–0.95)
MONOCYTES RELATIVE PERCENT: 10.3 % (ref 2–12)
NEUTROPHILS ABSOLUTE: 5.05 E9/L (ref 1.8–7.3)
NEUTROPHILS RELATIVE PERCENT: 59 % (ref 43–80)
PDW BLD-RTO: 15.1 FL (ref 11.5–15)
PLATELET # BLD: 330 E9/L (ref 130–450)
PMV BLD AUTO: 10.3 FL (ref 7–12)
POTASSIUM SERPL-SCNC: 4.6 MMOL/L (ref 3.5–5)
RBC # BLD: 3.88 E12/L (ref 3.5–5.5)
SODIUM BLD-SCNC: 135 MMOL/L (ref 132–146)
TOTAL PROTEIN: 7.3 G/DL (ref 6.4–8.3)
WBC # BLD: 8.6 E9/L (ref 4.5–11.5)

## 2022-03-11 LAB — VITAMIN D 25-HYDROXY: 96 NG/ML (ref 30–100)

## 2022-03-25 RX ORDER — CODEINE PHOSPHATE AND GUAIFENESIN 10; 100 MG/5ML; MG/5ML
SOLUTION ORAL
Qty: 600 ML | Refills: 0 | OUTPATIENT
Start: 2022-03-25

## 2022-04-04 RX ORDER — CODEINE PHOSPHATE AND GUAIFENESIN 10; 100 MG/5ML; MG/5ML
SOLUTION ORAL
Qty: 600 ML | Refills: 0 | OUTPATIENT
Start: 2022-04-04

## 2022-04-07 ENCOUNTER — TELEPHONE (OUTPATIENT)
Dept: FAMILY MEDICINE CLINIC | Age: 77
End: 2022-04-07

## 2022-04-07 DIAGNOSIS — R05.9 COUGH: ICD-10-CM

## 2022-04-07 RX ORDER — GUAIFENESIN AND CODEINE PHOSPHATE 100; 10 MG/5ML; MG/5ML
5 SOLUTION ORAL 3 TIMES DAILY PRN
Qty: 600 ML | Refills: 0 | Status: SHIPPED
Start: 2022-04-07 | End: 2022-07-13 | Stop reason: SDUPTHER

## 2022-04-07 NOTE — TELEPHONE ENCOUNTER
Chapis Calvo calling about the cough medication that she said she uses often. It was recently refused when the pharmacy sent a refill request.    She is asking if you will reconsider and give her a new script,  because it is the only medication that relieves her chronic cough.

## 2022-04-21 DIAGNOSIS — F51.01 PRIMARY INSOMNIA: ICD-10-CM

## 2022-04-21 RX ORDER — ZOLPIDEM TARTRATE 10 MG/1
TABLET ORAL
Qty: 30 TABLET | Refills: 0 | Status: SHIPPED
Start: 2022-04-21 | End: 2022-09-13

## 2022-04-21 NOTE — TELEPHONE ENCOUNTER
Last Appointment:  3/8/2022  Future Appointments   Date Time Provider Lorri Sequeira   8[facility]15/2022 10:30 [facility]   9/13/2022  3:30 PM Ana Maria Lamb  W [address] [place] [address]

## 2022-04-29 DIAGNOSIS — F41.9 ANXIETY: ICD-10-CM

## 2022-04-30 RX ORDER — LORAZEPAM 0.5 MG/1
TABLET ORAL
Qty: 90 TABLET | Refills: 0 | Status: SHIPPED
Start: 2022-04-30 | End: 2022-08-12

## 2022-06-16 ENCOUNTER — TELEPHONE (OUTPATIENT)
Dept: FAMILY MEDICINE CLINIC | Age: 77
End: 2022-06-16

## 2022-06-16 DIAGNOSIS — G47.33 OBSTRUCTIVE SLEEP APNEA SYNDROME: Primary | ICD-10-CM

## 2022-06-20 DIAGNOSIS — F51.01 PRIMARY INSOMNIA: ICD-10-CM

## 2022-06-20 RX ORDER — ZOLPIDEM TARTRATE 10 MG/1
TABLET ORAL
Qty: 30 TABLET | Refills: 0 | Status: SHIPPED
Start: 2022-06-20 | End: 2022-07-21

## 2022-07-13 DIAGNOSIS — R05.9 COUGH: ICD-10-CM

## 2022-07-13 RX ORDER — GUAIFENESIN AND CODEINE PHOSPHATE 100; 10 MG/5ML; MG/5ML
5 SOLUTION ORAL 3 TIMES DAILY PRN
Qty: 600 ML | Refills: 0 | Status: SHIPPED
Start: 2022-07-13 | End: 2022-10-03

## 2022-07-13 NOTE — TELEPHONE ENCOUNTER
Patient asked if you could refill her cough meds with codeine. She only takes it at night. Also, wants to you if you could change her Labetolol from 100mg 1/2 BID to 50 mg BID. She has a hard time cutting those pills in half.

## 2022-07-18 RX ORDER — LABETALOL 100 MG/1
50 TABLET, FILM COATED ORAL EVERY 12 HOURS SCHEDULED
Qty: 60 TABLET | Refills: 0 | Status: SHIPPED
Start: 2022-07-18 | End: 2022-10-03

## 2022-07-18 NOTE — TELEPHONE ENCOUNTER
Last Appointment:  3/8/2022  Future Appointments   Date Time Provider Lorri Sequeira   8/15/2022 10:30 [facility]   9/13/2022  3:30 PM Audie Zimmerman  W [address] [place] [address]

## 2022-07-20 DIAGNOSIS — F51.01 PRIMARY INSOMNIA: ICD-10-CM

## 2022-07-20 NOTE — TELEPHONE ENCOUNTER
Last Appointment:  3/8/2022  Future Appointments   Date Time Provider Lorri Sequeira   8/15/2022 10:30 AM DO Marcella [facility]   9/13/2022  3:30 PM Rosa Lawler  [address]

## 2022-07-21 RX ORDER — ZOLPIDEM TARTRATE 10 MG/1
TABLET ORAL
Qty: 30 TABLET | Refills: 2 | Status: SHIPPED | OUTPATIENT
Start: 2022-07-21 | End: 2022-08-20

## 2022-08-08 RX ORDER — ROSUVASTATIN CALCIUM 20 MG/1
TABLET, COATED ORAL
Qty: 90 TABLET | Refills: 0 | Status: SHIPPED
Start: 2022-08-08 | End: 2022-09-13 | Stop reason: SDUPTHER

## 2022-08-08 NOTE — TELEPHONE ENCOUNTER
Last Appointment:  3/8/2022  Future Appointments   Date Time Provider Lorri Sequeira   8/15/2022 10:15 AM SCHEDULE, MIYA Howard [facility]   8/15/2022 10:30 AM [facility]   9/13/2022  3:30 PM Scar Ernandez MD [address] [place] [address]

## 2022-08-12 DIAGNOSIS — F41.9 ANXIETY: ICD-10-CM

## 2022-08-12 RX ORDER — LORAZEPAM 0.5 MG/1
TABLET ORAL
Qty: 90 TABLET | Refills: 0 | Status: SHIPPED | OUTPATIENT
Start: 2022-08-12 | End: 2022-11-10

## 2022-08-17 DIAGNOSIS — K21.9 GASTROESOPHAGEAL REFLUX DISEASE WITHOUT ESOPHAGITIS: ICD-10-CM

## 2022-08-17 RX ORDER — PANTOPRAZOLE SODIUM 40 MG/1
TABLET, DELAYED RELEASE ORAL
Qty: 90 TABLET | Refills: 0 | Status: SHIPPED
Start: 2022-08-17 | End: 2022-09-13 | Stop reason: SDUPTHER

## 2022-08-17 RX ORDER — AMLODIPINE BESYLATE 10 MG/1
TABLET ORAL
Qty: 90 TABLET | Refills: 0 | Status: SHIPPED
Start: 2022-08-17 | End: 2022-09-13 | Stop reason: SDUPTHER

## 2022-08-17 NOTE — TELEPHONE ENCOUNTER
Last Appointment:  3/8/2022  Future Appointments   Date Time Provider Lorri Sequeira   9/13/2022  3:30 PM Destin Garza  W [facility] [address]

## 2022-09-08 RX ORDER — DULOXETIN HYDROCHLORIDE 60 MG/1
CAPSULE, DELAYED RELEASE ORAL
Qty: 90 CAPSULE | Refills: 0 | Status: SHIPPED
Start: 2022-09-08 | End: 2022-09-13 | Stop reason: SDUPTHER

## 2022-09-08 NOTE — TELEPHONE ENCOUNTER
"Has cat that was DX with round worm.  She now believes she also round worms. Today she found a worm in her stool.  Per guideline to see her provider within 24 hour and bring in a stool sample. Destiny was transferred to [facility] to set up an appointment with the [facility] on Friday.  Ava Spencer RN MAN   Triage Nurse Advisor [facility]    Additional Information    Negative: [1] Vomited 2 or more times AND [2] passed a large worm (8-10 inches; 20-25 cm) recently    Negative: Patient sounds very sick or weak to the triager    Passed a \"worm\" by rectum    Protocols used: WORMS - OTHER THAN [id number]-S-EQ    COVID 19 Nurse Triage Plan/Patient Instructions    Please be aware that novel coronavirus (COVID-19) may be circulating in the community. If you develop symptoms such as fever, cough, or SOB or if you have concerns about the presence of another infection including coronavirus (COVID-19), please contact your health care provider or visit [email or web address].     Disposition/Instructions    In-Person Visit with provider recommended. Reference Visit Selection Guide.    Thank you for taking steps to prevent the spread of this virus.  o Limit your contact with others.  o Wear a simple mask to cover your cough.  o Wash your hands well and often.    Resources    [facility]: About COVID-19: [email or web address]    CDC: What to Do If You're Sick: www.cdc.gov/coronavirus/2019-ncov/about/steps-when-sick.html    CDC: Ending Home Isolation: www.cdc.gov/coronavirus/2019-ncov/hcp/disposition-in-home-patients.html     CDC: Caring for Someone: www.cdc.gov/coronavirus/2019-ncov/if-you-are-sick/care-for-someone.html     [facility]: Interim Guidance for Hospital Discharge to Home: www.health.[facility].mn.us/diseases/coronavirus/hcp/hospdischarge.pdf    [facility] clinical trials (COVID-19 research studies): clinicalaffairs.[email or web address]/umn-clinical-trials     Below are the COVID-19 hotlines at " Last Appointment:  3/8/2022  Future Appointments   Date Time Provider Lorri Sequeira   9/13/2022  3:30 PM Thomas Rowell  W [address] [place] [address] the [place] Department of Health ([facility]). Interpreters are available.   o For health questions: Call [phone] or [phone] (7 a.m. to 7 p.m.)  o For questions about schools and childcare: Call [phone] or 1-831.691.3416 (7 a.m. to 7 p.m.)

## 2022-09-13 ENCOUNTER — OFFICE VISIT (OUTPATIENT)
Dept: FAMILY MEDICINE CLINIC | Age: 77
End: 2022-09-13
Payer: MEDICARE

## 2022-09-13 VITALS
TEMPERATURE: 98.8 F | OXYGEN SATURATION: 97 % | WEIGHT: 226 LBS | BODY MASS INDEX: 36.48 KG/M2 | HEART RATE: 69 BPM | SYSTOLIC BLOOD PRESSURE: 140 MMHG | DIASTOLIC BLOOD PRESSURE: 80 MMHG

## 2022-09-13 DIAGNOSIS — R53.82 CHRONIC FATIGUE: ICD-10-CM

## 2022-09-13 DIAGNOSIS — K21.9 GASTROESOPHAGEAL REFLUX DISEASE WITHOUT ESOPHAGITIS: ICD-10-CM

## 2022-09-13 DIAGNOSIS — G47.33 OBSTRUCTIVE SLEEP APNEA SYNDROME: ICD-10-CM

## 2022-09-13 DIAGNOSIS — E55.9 VITAMIN D DEFICIENCY: ICD-10-CM

## 2022-09-13 DIAGNOSIS — I10 ESSENTIAL HYPERTENSION: ICD-10-CM

## 2022-09-13 DIAGNOSIS — F51.01 PRIMARY INSOMNIA: ICD-10-CM

## 2022-09-13 DIAGNOSIS — I10 ESSENTIAL HYPERTENSION: Primary | ICD-10-CM

## 2022-09-13 DIAGNOSIS — E03.9 ACQUIRED HYPOTHYROIDISM: ICD-10-CM

## 2022-09-13 DIAGNOSIS — N18.31 STAGE 3A CHRONIC KIDNEY DISEASE (HCC): ICD-10-CM

## 2022-09-13 PROBLEM — N18.30 CHRONIC RENAL DISEASE, STAGE III (HCC): Status: ACTIVE | Noted: 2022-09-13

## 2022-09-13 LAB
ALBUMIN SERPL-MCNC: 4.2 G/DL (ref 3.5–5.2)
ALP BLD-CCNC: 65 U/L (ref 35–104)
ALT SERPL-CCNC: 11 U/L (ref 0–32)
ANION GAP SERPL CALCULATED.3IONS-SCNC: 13 MMOL/L (ref 7–16)
AST SERPL-CCNC: 22 U/L (ref 0–31)
BASOPHILS ABSOLUTE: 0.08 E9/L (ref 0–0.2)
BASOPHILS RELATIVE PERCENT: 1 % (ref 0–2)
BILIRUB SERPL-MCNC: 0.3 MG/DL (ref 0–1.2)
BUN BLDV-MCNC: 12 MG/DL (ref 6–23)
CALCIUM SERPL-MCNC: 9.7 MG/DL (ref 8.6–10.2)
CHLORIDE BLD-SCNC: 103 MMOL/L (ref 98–107)
CO2: 22 MMOL/L (ref 22–29)
CREAT SERPL-MCNC: 1 MG/DL (ref 0.5–1)
EOSINOPHILS ABSOLUTE: 0.51 E9/L (ref 0.05–0.5)
EOSINOPHILS RELATIVE PERCENT: 6.2 % (ref 0–6)
GFR AFRICAN AMERICAN: >60
GFR NON-AFRICAN AMERICAN: 53 ML/MIN/1.73
GLUCOSE BLD-MCNC: 88 MG/DL (ref 74–99)
HCT VFR BLD CALC: 37.7 % (ref 34–48)
HEMOGLOBIN: 12.6 G/DL (ref 11.5–15.5)
IMMATURE GRANULOCYTES #: 0.02 E9/L
IMMATURE GRANULOCYTES %: 0.2 % (ref 0–5)
LYMPHOCYTES ABSOLUTE: 2.24 E9/L (ref 1.5–4)
LYMPHOCYTES RELATIVE PERCENT: 27.3 % (ref 20–42)
MCH RBC QN AUTO: 31.8 PG (ref 26–35)
MCHC RBC AUTO-ENTMCNC: 33.4 % (ref 32–34.5)
MCV RBC AUTO: 95.2 FL (ref 80–99.9)
MONOCYTES ABSOLUTE: 0.99 E9/L (ref 0.1–0.95)
MONOCYTES RELATIVE PERCENT: 12 % (ref 2–12)
NEUTROPHILS ABSOLUTE: 4.38 E9/L (ref 1.8–7.3)
NEUTROPHILS RELATIVE PERCENT: 53.3 % (ref 43–80)
PDW BLD-RTO: 14.6 FL (ref 11.5–15)
PLATELET # BLD: 368 E9/L (ref 130–450)
PMV BLD AUTO: 9.6 FL (ref 7–12)
POTASSIUM SERPL-SCNC: 4 MMOL/L (ref 3.5–5)
RBC # BLD: 3.96 E12/L (ref 3.5–5.5)
SODIUM BLD-SCNC: 138 MMOL/L (ref 132–146)
TOTAL PROTEIN: 7.6 G/DL (ref 6.4–8.3)
TSH SERPL DL<=0.05 MIU/L-ACNC: 2.1 UIU/ML (ref 0.27–4.2)
WBC # BLD: 8.2 E9/L (ref 4.5–11.5)

## 2022-09-13 PROCEDURE — 1123F ACP DISCUSS/DSCN MKR DOCD: CPT | Performed by: INTERNAL MEDICINE

## 2022-09-13 PROCEDURE — 99214 OFFICE O/P EST MOD 30 MIN: CPT | Performed by: INTERNAL MEDICINE

## 2022-09-13 RX ORDER — AMLODIPINE BESYLATE 10 MG/1
TABLET ORAL
Qty: 90 TABLET | Refills: 1 | Status: SHIPPED | OUTPATIENT
Start: 2022-09-13

## 2022-09-13 RX ORDER — ROSUVASTATIN CALCIUM 20 MG/1
TABLET, COATED ORAL
Qty: 90 TABLET | Refills: 1 | Status: SHIPPED | OUTPATIENT
Start: 2022-09-13

## 2022-09-13 RX ORDER — DULOXETIN HYDROCHLORIDE 60 MG/1
CAPSULE, DELAYED RELEASE ORAL
Qty: 90 CAPSULE | Refills: 1 | Status: SHIPPED | OUTPATIENT
Start: 2022-09-13

## 2022-09-13 RX ORDER — ZOLPIDEM TARTRATE 10 MG/1
10 TABLET ORAL NIGHTLY PRN
Qty: 30 TABLET | Refills: 5 | Status: SHIPPED | OUTPATIENT
Start: 2022-09-13 | End: 2022-10-13

## 2022-09-13 RX ORDER — LEVOTHYROXINE SODIUM 0.07 MG/1
TABLET ORAL
Qty: 90 TABLET | Refills: 1 | Status: SHIPPED | OUTPATIENT
Start: 2022-09-13

## 2022-09-13 RX ORDER — ZOLPIDEM TARTRATE 10 MG/1
TABLET ORAL NIGHTLY PRN
COMMUNITY
End: 2022-09-13 | Stop reason: SDUPTHER

## 2022-09-13 RX ORDER — PANTOPRAZOLE SODIUM 40 MG/1
TABLET, DELAYED RELEASE ORAL
Qty: 90 TABLET | Refills: 1 | Status: SHIPPED | OUTPATIENT
Start: 2022-09-13

## 2022-09-13 NOTE — PROGRESS NOTES
queried about smoking behavior? Yes   Does the patient currently smoke? Smoking: Nonsmoker. Objective  Vitals:    09/13/22 1558   BP: (!) 140/80   Pulse: 69   Temp: 98.8 °F (37.1 °C)   SpO2: 97%   Exam:  Const: Appears healthy, well developed and well nourished. Appears  obese. Eyes: EOMI in both eyes. PERRL. ENMT: External ears WNL. Tympanic membranes are intact. External nose WNL. Neck: Supple and symmetric. Palpation reveals no adenopathy. No masses appreciated. Thyroid:  no nodule appreciated or thyromegaly. No jugular venous distention. Resp: Respirations are unlabored. Respiration rate is normal. Auscultate good airflow. No rales,  rhonchi or wheezes appreciated over the lungs bilaterally. CV: Rhythm is regular. S1 is normal. S2 is accentuated. S4 is audible. Carotids: no bruits. Abdominal aorta: not  palpable. Pedal pulses: 2+ and equal bilaterally. Extremities: No clubbing, cyanosis. Trace to 1+ edema below the mid tibia bilaterally. Abdomen: Bowel sounds are normoactive. Palpation reveals softness, with no distension,  organomegaly or tenderness. No abdominal masses palpable. No palpable hepatosplenomegaly. Musculo: Walks with a wide-based gait. Upper Extremities: ROM: Full ROM bilaterally. Lower  Extremities: ROM: Limitation of range of motion of both knees. Skin: Dry and warm with no rash. Neuro: Alert and oriented x3. Mood is normal. Affect is normal. Speech is articulate and fluent. DTR's are symmetric, intact and 2+ bilaterally. Otf Chapman was seen today for 6 month follow-up. Diagnoses and all orders for this visit:    Essential hypertension  -     zolpidem (AMBIEN) 10 MG tablet; Take 1 tablet by mouth nightly as needed for Sleep for up to 30 days. -     Vitamin D 25 Hydroxy; Future  -     Comprehensive Metabolic Panel; Future  -     CBC with Auto Differential; Future  -     TSH;  Future    Gastroesophageal reflux disease without esophagitis  -     pantoprazole (PROTONIX) 40 MG tablet; TAKE ONE TABLET BY MOUTH DAILY  -     zolpidem (AMBIEN) 10 MG tablet; Take 1 tablet by mouth nightly as needed for Sleep for up to 30 days. -     Vitamin D 25 Hydroxy; Future  -     Comprehensive Metabolic Panel; Future  -     CBC with Auto Differential; Future  -     TSH; Future    Acquired hypothyroidism  -     zolpidem (AMBIEN) 10 MG tablet; Take 1 tablet by mouth nightly as needed for Sleep for up to 30 days. -     levothyroxine (SYNTHROID) 75 MCG tablet; One po daily  -     Vitamin D 25 Hydroxy; Future  -     Comprehensive Metabolic Panel; Future  -     CBC with Auto Differential; Future  -     TSH; Future    Stage 3a chronic kidney disease (HCC)  -     zolpidem (AMBIEN) 10 MG tablet; Take 1 tablet by mouth nightly as needed for Sleep for up to 30 days. -     Vitamin D 25 Hydroxy; Future  -     Comprehensive Metabolic Panel; Future  -     CBC with Auto Differential; Future  -     TSH; Future    Obstructive sleep apnea syndrome  -     zolpidem (AMBIEN) 10 MG tablet; Take 1 tablet by mouth nightly as needed for Sleep for up to 30 days. -     Vitamin D 25 Hydroxy; Future  -     Comprehensive Metabolic Panel; Future  -     CBC with Auto Differential; Future  -     TSH; Future    Primary insomnia  -     zolpidem (AMBIEN) 10 MG tablet; Take 1 tablet by mouth nightly as needed for Sleep for up to 30 days. -     Vitamin D 25 Hydroxy; Future  -     Comprehensive Metabolic Panel; Future  -     CBC with Auto Differential; Future  -     TSH; Future    Chronic fatigue  -     zolpidem (AMBIEN) 10 MG tablet; Take 1 tablet by mouth nightly as needed for Sleep for up to 30 days. -     Vitamin D 25 Hydroxy; Future  -     Comprehensive Metabolic Panel; Future  -     CBC with Auto Differential; Future  -     TSH;  Future    Vitamin D deficiency    Other orders  -     DULoxetine (CYMBALTA) 60 MG extended release capsule; TAKE ONE CAPSULE BY MOUTH EVERY DAY  -     rosuvastatin (CRESTOR) 20 MG tablet; TAKE ONE TABLET BY MOUTH DAILY  -     amLODIPine (NORVASC) 10 MG tablet; TAKE ONE TABLET BY MOUTH DAILY  Patient will have lab work. I will let her know the results of this testing. The patient is to once again enroll in physical therapy. Blood pressure here is well controlled. I will see her back in approximately 6 months. The patient has been compliant with her CPAP machine. It is older than 11years old and is broken. She is using a loaner machine and she will need  new equipment when available.

## 2022-09-14 LAB — VITAMIN D 25-HYDROXY: 99 NG/ML (ref 30–100)

## 2022-10-03 DIAGNOSIS — R05.9 COUGH: ICD-10-CM

## 2022-10-03 RX ORDER — LABETALOL 100 MG/1
TABLET, FILM COATED ORAL
Qty: 60 TABLET | Refills: 0 | Status: SHIPPED | OUTPATIENT
Start: 2022-10-03

## 2022-10-03 RX ORDER — CODEINE PHOSPHATE AND GUAIFENESIN 10; 100 MG/5ML; MG/5ML
5 SOLUTION ORAL 3 TIMES DAILY PRN
Qty: 240 ML | Refills: 1 | Status: SHIPPED | OUTPATIENT
Start: 2022-10-03 | End: 2022-11-02

## 2022-10-13 ENCOUNTER — TELEPHONE (OUTPATIENT)
Dept: FAMILY MEDICINE CLINIC | Age: 77
End: 2022-10-13

## 2022-10-13 NOTE — TELEPHONE ENCOUNTER
Dr Monica Agustin added the addendum to his last office note. The [facility] form came through but it was hard to read (lots of lines cutting off words on fax). Asked that they refax.

## 2022-10-13 NOTE — TELEPHONE ENCOUNTER
Alisia Henao returned call. Said she did not know who was the ordering physician or who the sleep specialist was that she saw. I reviewed her chart and saw the original sleep study from [facility] and orders for replacement CPAP supplies that you signed for in the past.     I called [facility] and they will send a form with all the information. The form will need to be signed and we will need to include a copy of you 9/13/22 office note. Not will will need to have an addendum added saying \"Ivis's CPAP machine is greater than 11years old. Her machine is broken and she is currently using a loaner CPAP machine. \"

## 2022-10-13 NOTE — TELEPHONE ENCOUNTER
Pt called stating [facility] is requesting a new script for her to receive a new C-PAP machine. This order needs to be faxed to [phone].

## 2022-10-13 NOTE — TELEPHONE ENCOUNTER
She needs to get this order from her sleep physician.   We have no idea about her settings etc.  Please have her call them and get it from the subspecialist.

## 2022-11-07 RX ORDER — FLUTICASONE PROPIONATE 50 MCG
SPRAY, SUSPENSION (ML) NASAL
Qty: 16 G | Refills: 0 | Status: SHIPPED | OUTPATIENT
Start: 2022-11-07

## 2022-11-16 DIAGNOSIS — F41.9 ANXIETY: ICD-10-CM

## 2022-11-16 NOTE — TELEPHONE ENCOUNTER
Last Appointment:  Visit date not found  Future Appointments   Date Time Provider Lorri Sequeira   3/15/2023  2:30 PM Edwardo Stanton  [address]

## 2022-11-17 RX ORDER — ALENDRONATE SODIUM 70 MG/1
70 TABLET ORAL
Qty: 12 TABLET | Refills: 1 | Status: SHIPPED | OUTPATIENT
Start: 2022-11-17

## 2022-11-17 RX ORDER — LORAZEPAM 0.5 MG/1
TABLET ORAL
Qty: 90 TABLET | Refills: 0 | Status: SHIPPED | OUTPATIENT
Start: 2022-11-17 | End: 2023-02-15

## 2022-11-17 NOTE — TELEPHONE ENCOUNTER
Last Appointment:  9/13/2022  Future Appointments   Date Time Provider Lorri Sequeira   3/15/2023  2:30 PM Kenny Martinez  W [address] [place] [address]

## 2022-11-25 ENCOUNTER — TELEPHONE (OUTPATIENT)
Dept: FAMILY MEDICINE CLINIC | Age: 77
End: 2022-11-25

## 2022-11-25 NOTE — TELEPHONE ENCOUNTER
I called patient to see if an MRI had been ordered (note from Dr Fraser).  She states it's scheduled for this Sunday.  I told her to make sure they send you the report.

## 2022-11-28 ENCOUNTER — PREP FOR PROCEDURE (OUTPATIENT)
Dept: UROLOGY | Age: 77
End: 2022-11-28

## 2022-11-28 RX ORDER — SODIUM CHLORIDE 9 MG/ML
INJECTION, SOLUTION INTRAVENOUS PRN
Status: CANCELLED | OUTPATIENT
Start: 2022-11-28

## 2022-11-28 RX ORDER — SODIUM CHLORIDE 9 MG/ML
INJECTION, SOLUTION INTRAVENOUS CONTINUOUS
Status: CANCELLED | OUTPATIENT
Start: 2022-11-28

## 2022-11-28 RX ORDER — SODIUM CHLORIDE 0.9 % (FLUSH) 0.9 %
5-40 SYRINGE (ML) INJECTION PRN
Status: CANCELLED | OUTPATIENT
Start: 2022-11-28

## 2022-11-28 RX ORDER — SODIUM CHLORIDE 0.9 % (FLUSH) 0.9 %
5-40 SYRINGE (ML) INJECTION EVERY 12 HOURS SCHEDULED
Status: CANCELLED | OUTPATIENT
Start: 2022-11-28

## 2022-11-30 RX ORDER — TRIMETHOPRIM 100 MG/1
TABLET ORAL
COMMUNITY
Start: 2022-09-27 | End: 2022-11-30

## 2022-11-30 RX ORDER — FLUCONAZOLE 100 MG/1
TABLET ORAL
COMMUNITY
Start: 2022-11-05 | End: 2022-11-30 | Stop reason: ALTCHOICE

## 2022-12-02 ENCOUNTER — ANESTHESIA (OUTPATIENT)
Dept: OPERATING ROOM | Age: 77
End: 2022-12-02
Payer: MEDICARE

## 2022-12-02 ENCOUNTER — HOSPITAL ENCOUNTER (OUTPATIENT)
Age: 77
Setting detail: OUTPATIENT SURGERY
Discharge: HOME | End: 2022-12-02
Attending: UROLOGY | Admitting: UROLOGY
Payer: MEDICARE

## 2022-12-02 ENCOUNTER — ANESTHESIA EVENT (OUTPATIENT)
Dept: OPERATING ROOM | Age: 77
End: 2022-12-02
Payer: MEDICARE

## 2022-12-02 ENCOUNTER — HOSPITAL ENCOUNTER (OUTPATIENT)
Dept: GENERAL RADIOLOGY | Age: 77
Setting detail: OUTPATIENT SURGERY
End: 2022-12-02
Attending: UROLOGY
Payer: MEDICARE

## 2022-12-02 VITALS
OXYGEN SATURATION: 96 % | SYSTOLIC BLOOD PRESSURE: 152 MMHG | HEART RATE: 55 BPM | RESPIRATION RATE: 16 BRPM | TEMPERATURE: 97.6 F | BODY MASS INDEX: 35.36 KG/M2 | WEIGHT: 220 LBS | HEIGHT: 66 IN | DIASTOLIC BLOOD PRESSURE: 80 MMHG

## 2022-12-02 DIAGNOSIS — R52 PAIN: ICD-10-CM

## 2022-12-02 DIAGNOSIS — G89.18 POST-OP PAIN: Primary | ICD-10-CM

## 2022-12-02 LAB
EKG ATRIAL RATE: 65 BPM
EKG P AXIS: 61 DEGREES
EKG P-R INTERVAL: 170 MS
EKG Q-T INTERVAL: 442 MS
EKG QRS DURATION: 90 MS
EKG QTC CALCULATION (BAZETT): 459 MS
EKG R AXIS: 24 DEGREES
EKG T AXIS: -18 DEGREES
EKG VENTRICULAR RATE: 65 BPM

## 2022-12-02 PROCEDURE — 3700000000 HC ANESTHESIA ATTENDED CARE: Performed by: UROLOGY

## 2022-12-02 PROCEDURE — 6360000002 HC RX W HCPCS: Performed by: NURSE ANESTHETIST, CERTIFIED REGISTERED

## 2022-12-02 PROCEDURE — 3600000002 HC SURGERY LEVEL 2 BASE: Performed by: UROLOGY

## 2022-12-02 PROCEDURE — 2580000003 HC RX 258: Performed by: NURSE PRACTITIONER

## 2022-12-02 PROCEDURE — 7100000010 HC PHASE II RECOVERY - FIRST 15 MIN: Performed by: UROLOGY

## 2022-12-02 PROCEDURE — 6370000000 HC RX 637 (ALT 250 FOR IP): Performed by: UROLOGY

## 2022-12-02 PROCEDURE — 3600000012 HC SURGERY LEVEL 2 ADDTL 15MIN: Performed by: UROLOGY

## 2022-12-02 PROCEDURE — 93005 ELECTROCARDIOGRAM TRACING: CPT

## 2022-12-02 PROCEDURE — 2580000003 HC RX 258: Performed by: NURSE ANESTHETIST, CERTIFIED REGISTERED

## 2022-12-02 PROCEDURE — 2709999900 HC NON-CHARGEABLE SUPPLY: Performed by: UROLOGY

## 2022-12-02 PROCEDURE — 2500000003 HC RX 250 WO HCPCS: Performed by: UROLOGY

## 2022-12-02 PROCEDURE — 3700000001 HC ADD 15 MINUTES (ANESTHESIA): Performed by: UROLOGY

## 2022-12-02 PROCEDURE — 7100000011 HC PHASE II RECOVERY - ADDTL 15 MIN: Performed by: UROLOGY

## 2022-12-02 PROCEDURE — 6360000002 HC RX W HCPCS: Performed by: NURSE PRACTITIONER

## 2022-12-02 RX ORDER — OXYCODONE HYDROCHLORIDE AND ACETAMINOPHEN 5; 325 MG/1; MG/1
1 TABLET ORAL EVERY 4 HOURS PRN
Qty: 10 TABLET | Refills: 0 | Status: SHIPPED | OUTPATIENT
Start: 2022-12-02 | End: 2022-12-09

## 2022-12-02 RX ORDER — SODIUM CHLORIDE 0.9 % (FLUSH) 0.9 %
5-40 SYRINGE (ML) INJECTION PRN
Status: DISCONTINUED | OUTPATIENT
Start: 2022-12-02 | End: 2022-12-02 | Stop reason: HOSPADM

## 2022-12-02 RX ORDER — SODIUM CHLORIDE 9 MG/ML
INJECTION, SOLUTION INTRAVENOUS PRN
Status: DISCONTINUED | OUTPATIENT
Start: 2022-12-02 | End: 2022-12-02 | Stop reason: HOSPADM

## 2022-12-02 RX ORDER — OXYCODONE HYDROCHLORIDE AND ACETAMINOPHEN 5; 325 MG/1; MG/1
1 TABLET ORAL EVERY 4 HOURS PRN
Status: DISCONTINUED | OUTPATIENT
Start: 2022-12-02 | End: 2022-12-02 | Stop reason: HOSPADM

## 2022-12-02 RX ORDER — FENTANYL CITRATE 50 UG/ML
50 INJECTION, SOLUTION INTRAMUSCULAR; INTRAVENOUS EVERY 5 MIN PRN
Status: DISCONTINUED | OUTPATIENT
Start: 2022-12-02 | End: 2022-12-02 | Stop reason: HOSPADM

## 2022-12-02 RX ORDER — DIPHENHYDRAMINE HYDROCHLORIDE 50 MG/ML
12.5 INJECTION INTRAMUSCULAR; INTRAVENOUS
Status: DISCONTINUED | OUTPATIENT
Start: 2022-12-02 | End: 2022-12-02 | Stop reason: HOSPADM

## 2022-12-02 RX ORDER — PROPOFOL 10 MG/ML
INJECTION, EMULSION INTRAVENOUS CONTINUOUS PRN
Status: DISCONTINUED | OUTPATIENT
Start: 2022-12-02 | End: 2022-12-02 | Stop reason: SDUPTHER

## 2022-12-02 RX ORDER — SODIUM CHLORIDE 9 MG/ML
INJECTION, SOLUTION INTRAVENOUS CONTINUOUS
Status: DISCONTINUED | OUTPATIENT
Start: 2022-12-02 | End: 2022-12-02 | Stop reason: HOSPADM

## 2022-12-02 RX ORDER — FENTANYL CITRATE 50 UG/ML
INJECTION, SOLUTION INTRAMUSCULAR; INTRAVENOUS PRN
Status: DISCONTINUED | OUTPATIENT
Start: 2022-12-02 | End: 2022-12-02 | Stop reason: SDUPTHER

## 2022-12-02 RX ORDER — SODIUM CHLORIDE 9 MG/ML
INJECTION, SOLUTION INTRAVENOUS CONTINUOUS PRN
Status: DISCONTINUED | OUTPATIENT
Start: 2022-12-02 | End: 2022-12-02 | Stop reason: SDUPTHER

## 2022-12-02 RX ORDER — PROCHLORPERAZINE EDISYLATE 5 MG/ML
5 INJECTION INTRAMUSCULAR; INTRAVENOUS
Status: DISCONTINUED | OUTPATIENT
Start: 2022-12-02 | End: 2022-12-02 | Stop reason: HOSPADM

## 2022-12-02 RX ORDER — CEPHALEXIN 500 MG/1
500 CAPSULE ORAL 2 TIMES DAILY
Qty: 10 CAPSULE | Refills: 0 | Status: SHIPPED | OUTPATIENT
Start: 2022-12-02 | End: 2022-12-07

## 2022-12-02 RX ORDER — SODIUM CHLORIDE 0.9 % (FLUSH) 0.9 %
5-40 SYRINGE (ML) INJECTION EVERY 12 HOURS SCHEDULED
Status: DISCONTINUED | OUTPATIENT
Start: 2022-12-02 | End: 2022-12-02 | Stop reason: HOSPADM

## 2022-12-02 RX ORDER — LIDOCAINE HYDROCHLORIDE 10 MG/ML
INJECTION, SOLUTION INFILTRATION; PERINEURAL PRN
Status: DISCONTINUED | OUTPATIENT
Start: 2022-12-02 | End: 2022-12-02 | Stop reason: ALTCHOICE

## 2022-12-02 RX ADMIN — WATER 2000 MG: 1 INJECTION INTRAMUSCULAR; INTRAVENOUS; SUBCUTANEOUS at 10:15

## 2022-12-02 RX ADMIN — FENTANYL CITRATE 50 MCG: 50 INJECTION, SOLUTION INTRAMUSCULAR; INTRAVENOUS at 10:20

## 2022-12-02 RX ADMIN — FENTANYL CITRATE 50 MCG: 50 INJECTION, SOLUTION INTRAMUSCULAR; INTRAVENOUS at 10:31

## 2022-12-02 RX ADMIN — OXYCODONE AND ACETAMINOPHEN 1 TABLET: 5; 325 TABLET ORAL at 11:24

## 2022-12-02 RX ADMIN — SODIUM CHLORIDE: 9 INJECTION, SOLUTION INTRAVENOUS at 10:17

## 2022-12-02 RX ADMIN — PROPOFOL 90 MCG/KG/MIN: 10 INJECTION, EMULSION INTRAVENOUS at 10:24

## 2022-12-02 RX ADMIN — PROPOFOL 90 MCG/KG/MIN: 10 INJECTION, EMULSION INTRAVENOUS at 10:21

## 2022-12-02 ASSESSMENT — PAIN DESCRIPTION - DESCRIPTORS: DESCRIPTORS: ACHING;BURNING

## 2022-12-02 ASSESSMENT — PAIN - FUNCTIONAL ASSESSMENT: PAIN_FUNCTIONAL_ASSESSMENT: 0-10

## 2022-12-02 ASSESSMENT — LIFESTYLE VARIABLES: SMOKING_STATUS: 0

## 2022-12-02 NOTE — DISCHARGE INSTRUCTIONS
Follow up Dr. Olivia Almonte Harrison in 6 week, [phone]     Place the patients navarrete to leg bag on discharge from the hospital.  Please give the patient a night bag (large bag) and navarrete instructions upon discharge. Please have the patient contact the office for navarrete removal instructions. The catheter may go red (hematuria), may go clear, and may go red. This is a normal process, as long as the catheter is draining. Call the office if any concerns should arise for further instructions, [phone].

## 2022-12-02 NOTE — ANESTHESIA POSTPROCEDURE EVALUATION
Department of Anesthesiology  Postprocedure Note    Patient: Liliana Kelly  MRN: [id number]  YOB: 1945  Date of evaluation: 12/2/2022      Procedure Summary     Date: 12/02/22 Room / Location: [facility] OR 04 / [facility]    Anesthesia Start: 0532 Anesthesia Stop: 5132    Procedure: CYSTOSCOPY INSERTION OF SUPRAPUBIC TUBE (Bladder) Diagnosis:       Detrusor instability of bladder      Urinary pain      (Detrusor instability of bladder [N32.81])      (Urinary pain [R30.9])    Surgeons: Jaden Lopez DO Responsible Provider: Miky Michael MD    Anesthesia Type: MAC ASA Status: 3          Anesthesia Type: No value filed.     Whitney Phase I: Whitney Score: 10    Whitney Phase II:        Anesthesia Post Evaluation    Patient location during evaluation: PACU  Patient participation: complete - patient participated  Level of consciousness: awake and alert  Pain score: 0  Airway patency: patent  Nausea & Vomiting: no nausea and no vomiting  Complications: no  Cardiovascular status: blood pressure returned to baseline  Respiratory status: acceptable  Hydration status: euvolemic

## 2022-12-02 NOTE — ANESTHESIA PRE PROCEDURE
Department of Anesthesiology  Preprocedure Note       Name:  Terry Ivory   Age:  76 y.o.  :  1945                                          MRN:  [id number]         Date:  2022      Surgeon: Kenzie Sr):  Fabby MONTOYA Memo, DO    Procedure: Procedure(s):  CYSTOSCOPY INSERTION OF SUPRAPUBIC TUBE    Medications prior to admission:   Prior to Admission medications    Medication Sig Start Date End Date Taking? Authorizing Provider   fluticasone (FLONASE) 50 MCG/ACT nasal spray USE 2 SPRAYS NASALLY AS DIRECTED DAILY 22   Jt Isabel MD   LORazepam (ATIVAN) 0.5 MG tablet TAKE ONE TABLET BY MOUTH EVERY 8 HOURS AS NEEDED FOR ANXIETY 11/17/22 2/15/23  tJ Isabel MD   alendronate (FOSAMAX) 70 MG tablet TAKE 1 TABLET BY MOUTH EVERY 7 DAYS 22   Jt Isabel MD   labetalol (NORMODYNE) 100 MG tablet TAKE 1/2 TABLET BY MOUTH IN THE MORNING AND 1/2 TABLET BEFORE BEDTIME 10/3/22   Jaiden Reza, APRN - CNP   DULoxetine (CYMBALTA) 60 MG extended release capsule TAKE ONE CAPSULE BY MOUTH EVERY DAY 22   Jt Isabel MD   rosuvastatin (CRESTOR) 20 MG tablet TAKE ONE TABLET BY MOUTH DAILY 22   Jt Isabel MD   pantoprazole (PROTONIX) 40 MG tablet TAKE ONE TABLET BY MOUTH DAILY  Patient taking differently: nightly TAKE ONE TABLET BY MOUTH DAILY 22   Jt Isabel MD   amLODIPine (NORVASC) 10 MG tablet TAKE ONE TABLET BY MOUTH DAILY 22   Jt Isabel MD   levothyroxine (SYNTHROID) 75 MCG tablet One po daily 22   Jt Isabel MD   bumetanide (BUMEX) 2 MG tablet Take 1 tablet by mouth daily 21   Jt Isabel MD   Multiple Vitamin (MVI, CELEBRATE, CHEWABLE TABLET) Take one(1) tablet daily.  11/10/03   Historical Provider, MD   vitamin C (ASCORBIC ACID) 500 MG tablet Take by mouth 05   Historical Provider, MD   potassium chloride (KLOR-CON) 20 MEQ packet Take 40 mEq by mouth daily Only when taking water pills    Historical Provider, MD       Current medications: Current Facility-Administered Medications   Medication Dose Route Frequency Provider Last Rate Last Admin    0.9 % sodium chloride infusion   IntraVENous Continuous Treadwell Sullivan, APRN - CNP        sodium chloride flush 0.9 % injection 5-40 mL  5-40 mL IntraVENous 2 times per day Treadwell Sullivan, APRN - CNP        sodium chloride flush 0.9 % injection 5-40 mL  5-40 mL IntraVENous PRN Treadwell Sullivan, APRN - CNP        0.9 % sodium chloride infusion   IntraVENous PRN Treadwell Sullivan, APRN - CNP        ceFAZolin (ANCEF) 2,000 mg in sterile water 20 mL IV syringe  2,000 mg IntraVENous On Call to [address], APRN - CNP           Allergies:     Allergies   Allergen Reactions    Fesoterodine Rash       Problem List:    Patient Active Problem List   Diagnosis Code    Sleep apnea G47.30    Essential hypertension I10    Chronic fatigue R53.82    Primary insomnia F51.01    Lumbar radiculopathy M54.16    Gastroesophageal reflux disease without esophagitis K21.9    Hypothyroidism E03.9    Anxiety F41.9    Morbidly obese ([facility] [address] E66.01    Dizziness R42    Syncope and collapse R55    Anemia D64.9    Age related osteoporosis M81.0    Chronic renal disease, stage III ([address] [209278] N18.30    Vitamin D deficiency E55.9       Past Medical History:        Diagnosis Date    Anxiety     Arthritis     Diastolic dysfunction 56/18/0169    CARDIOLOGY/PULMONARY CONSULTS, 7/2018    Dizziness and giddiness 2/8/2021    Hypertension     Hypothyroidism     Neuropathy     PERIPHERAL    Osteoarthritis     Sleep apnea     RECENT ADJUSTMENT OF CPAP    Tinnitus of right ear 2/8/2021    Urinary incontinence     Vestibulopathy 2/8/2021       Past Surgical History:        Procedure Laterality Date    APPENDECTOMY      BLADDER SURGERY      CARDIAC CATHETERIZATION Right 05/2018    CATARACT REMOVAL      CHOLECYSTECTOMY      HYSTERECTOMY (CERVIX STATUS UNKNOWN)      OTHER SURGICAL HISTORY      multiple urologic surgeries including stim       Social History:    Social History     Tobacco Use    Smoking status: Never    Smokeless tobacco: Never   Substance Use Topics    Alcohol use: Yes     Comment: Socially                                Counseling given: Not Answered      Vital Signs (Current):   Vitals:    11/30/22 1122   Weight: 240 lb (108.9 kg)   Height: 5' 6\" (1.676 m)                                              BP Readings from Last 3 Encounters:   09/13/22 (!) 140/80   03/08/22 (!) 160/80   02/02/22 (!) 140/53       NPO Status:                                                                                 BMI:   Wt Readings from Last 3 Encounters:   11/30/22 240 lb (108.9 kg)   09/13/22 226 lb (102.5 kg)   03/08/22 228 lb (103.4 kg)     Body mass index is 38.74 kg/m². CBC:   Lab Results   Component Value Date/Time    WBC 8.2 09/13/2022 04:46 PM    RBC 3.96 09/13/2022 04:46 PM    HGB 12.6 09/13/2022 04:46 PM    HCT 37.7 09/13/2022 04:46 PM    MCV 95.2 09/13/2022 04:46 PM    RDW 14.6 09/13/2022 04:46 PM     09/13/2022 04:46 PM       CMP:   Lab Results   Component Value Date/Time     09/13/2022 04:46 PM    K 4.0 09/13/2022 04:46 PM    K 3.7 02/02/2022 03:32 AM     09/13/2022 04:46 PM    CO2 22 09/13/2022 04:46 PM    BUN 12 09/13/2022 04:46 PM    CREATININE 1.0 09/13/2022 04:46 PM    GFRAA >60 09/13/2022 04:46 PM    AGRATIO 1.3 08/20/2020 03:27 PM    LABGLOM 53 09/13/2022 04:46 PM    GLUCOSE 88 09/13/2022 04:46 PM    PROT 7.6 09/13/2022 04:46 PM    CALCIUM 9.7 09/13/2022 04:46 PM    BILITOT 0.3 09/13/2022 04:46 PM    ALKPHOS 65 09/13/2022 04:46 PM    AST 22 09/13/2022 04:46 PM    ALT 11 09/13/2022 04:46 PM       POC Tests: No results for input(s): POCGLU, POCNA, POCK, POCCL, POCBUN, POCHEMO, POCHCT in the last 72 hours.     Coags:   Lab Results   Component Value Date/Time    PROTIME 12.7 02/01/2022 06:53 PM    INR 1.1 02/01/2022 06:53 PM    APTT 27.2 02/01/2022 06:53 PM       HCG (If Applicable): No results found for: PREGTESTUR, PREGSERUM, HCG, HCGQUANT     ABGs: No results found for: PHART, PO2ART, KIQ7ZLQ, GZO6ISX, BEART, N3CFNTUG     Type & Screen (If Applicable):  No results found for: LABABO, LABRH    Drug/Infectious Status (If Applicable):  No results found for: HIV, HEPCAB    COVID-19 Screening (If Applicable): No results found for: COVID19        Anesthesia Evaluation  Patient summary reviewed and Nursing notes reviewed no history of anesthetic complications:   Airway: Mallampati: II  TM distance: >3 FB   Neck ROM: full  Mouth opening: > = 3 FB   Dental:          Pulmonary: breath sounds clear to auscultation  (+) sleep apnea: on CPAP,      (-) not a current smoker                           Cardiovascular:    (+) hypertension:, hyperlipidemia      ECG reviewed  Rhythm: regular  Rate: normal           Beta Blocker:  Not on Beta Blocker         Neuro/Psych:   (+) neuromuscular disease:, depression/anxiety             GI/Hepatic/Renal:   (+) morbid obesity          Endo/Other:    (+) hypothyroidism: arthritis: OA., .                 Abdominal:             Vascular: negative vascular ROS. Other Findings:           Anesthesia Plan      MAC     ASA 3       Induction: intravenous. Anesthetic plan and risks discussed with patient and child/children.                         Fany Ramirez MD   12/2/2022

## 2022-12-02 NOTE — OP NOTE
12/2/2022 8:56 AM  Service: Urology  Group: [facility] ([facility])    Reyes Jasper  [id number]    SURGEON: SHIRA JAMESON D.O.   ASSISTANT: None  PREOPERATIVE DIAGNOSES: Neurogenic bladder  POSTOPERATIVE DIAGNOSES: Neurogenic bladder   OPERATION: Cystoscopy, suprapubic tube insertion. ANESTHESIA: Monitored anesthesia care as well as local infiltration with   lidocaine. ESTIMATED BLOOD LOSS: minimal  COMPLICATIONS: none  CONDITION: PACU stable. SPECIAL MEDICATION PREOPERATIVELY: Antibiotics. STORY ON THIS PATIENT: This is Reyes Jasper 76 y.o. female with retention and neurogenic bladder. He was consented for the above procedure. The risks, the benefits and the alternatives of the proposed procedure were explained. The patient consented to have the procedure done. PROCEDURE: This pleasant female was brought to the operative setting, placed   in a supine position. He was induced with monitored anesthesia care. Anesthesia monitored the head and neck, airway, IV access, and vital signs   throughout the course of the case. Status post induction, the patient was   placed in the dorsal lithotomy position. All underlying points of pressure   were padded. He was then prepped and draped in normal sterile fashion. We proceeded by taking a 21-Citizen [place] [employer] cystoscope with a 30-degree lens   inserted through the meatus in atraumatic fashion. Panendoscopic   visualization of the urethra were basically devoid of any significant masses, tumors, lesions, or defects. Upon entrance into the bladder, it had normal spherical   configuration. There was trabeculation throughout. It was at this   point that we basically filled the bladder up to maximum distention. I   removed the cystoscope. I then took the curved [place]ley and inserted it easily into the distal meatus and into the bladder. I was able to palpate on the anterior abdominal wall   the tip of the curved [place].  I did inject this general area with   approximately 10 mL of lidocaine. With the aid of a 15-blade, I made a small   1-cm incision. I was able to take the tip, dissect down to it and then bring   the tip up. This was done in atraumatic fashion. Through the curved Lowsley, there   was a drilled eyelet. Through that, I placed a silk stitch and then I took   that same silk stitch and tied it to a 30-[place] Staples catheter. I then   removed the curved Lowsley. This brought the Staples catheter into the bladder   and then it was externalized through the urethral meatus. I then cut the   silk stitch, removed the curved Lowsley. With the aid of a cystoscope, I   looked the distal tip of the Staples back into the bladder. It was at this   point, once it was appropriately positioned, I then filled the balloon with   approximately 10 mL of sterile water. I did remove the cystoscope. I then stitched the suprapubic tube to the skin with an 0 silk on a cutting   needle. A couple of 4-0 chromics were also applied just to give it more of a   cosmetic appearance. The Staples catheter was unclamped and got clear urine. She tolerated the procedure quite well. There were no intraoperative   complications. Please note: Will plan to change the suprapubic tube in about   4 weeks.        Electronically crearted by: Nadeem Cisse D.O.

## 2023-01-01 ENCOUNTER — TELEPHONE (OUTPATIENT)
Dept: PRIMARY CARE CLINIC | Age: 78
End: 2023-01-01

## 2023-01-17 NOTE — TELEPHONE ENCOUNTER
Last Appointment:  Visit date not found  Future Appointments   Date Time Provider Lorri Sequeira   3/15/2023  2:30 PM Haider Balderrama  [address]

## 2023-01-18 RX ORDER — LABETALOL 100 MG/1
TABLET, FILM COATED ORAL
Qty: 60 TABLET | Refills: 0 | Status: SHIPPED | OUTPATIENT
Start: 2023-01-18

## 2023-01-23 ENCOUNTER — TELEPHONE (OUTPATIENT)
Dept: FAMILY MEDICINE CLINIC | Age: 78
End: 2023-01-23

## 2023-01-23 RX ORDER — DULOXETIN HYDROCHLORIDE 30 MG/1
30 CAPSULE, DELAYED RELEASE ORAL 3 TIMES DAILY
Qty: 90 CAPSULE | Refills: 2 | Status: SHIPPED | OUTPATIENT
Start: 2023-01-23

## 2023-01-23 NOTE — TELEPHONE ENCOUNTER
Patient called. She said she is ready to be on the 90mg for Cymbalta. She said the pain is getting worse in her legs. Please advise.

## 2023-01-28 NOTE — TELEPHONE ENCOUNTER
Last Appointment:  9/13/2022  Future Appointments   Date Time Provider Lorri Sequeira   3/15/2023  2:30 PM Ayo Shepherd  [address] [place] [address]

## 2023-01-29 RX ORDER — FLUTICASONE PROPIONATE 50 MCG
SPRAY, SUSPENSION (ML) NASAL
Qty: 16 G | Refills: 3 | Status: SHIPPED | OUTPATIENT
Start: 2023-01-29

## 2023-02-20 DIAGNOSIS — F41.9 ANXIETY: ICD-10-CM

## 2023-02-20 RX ORDER — LORAZEPAM 0.5 MG/1
TABLET ORAL
Qty: 90 TABLET | Refills: 0 | Status: SHIPPED | OUTPATIENT
Start: 2023-02-20 | End: 2023-05-21

## 2023-03-15 ENCOUNTER — OFFICE VISIT (OUTPATIENT)
Dept: FAMILY MEDICINE CLINIC | Age: 78
End: 2023-03-15

## 2023-03-15 VITALS
OXYGEN SATURATION: 98 % | BODY MASS INDEX: 35.19 KG/M2 | TEMPERATURE: 97.1 F | SYSTOLIC BLOOD PRESSURE: 138 MMHG | DIASTOLIC BLOOD PRESSURE: 70 MMHG | HEART RATE: 63 BPM | WEIGHT: 218 LBS

## 2023-03-15 DIAGNOSIS — F13.20 SEDATIVE, HYPNOTIC OR ANXIOLYTIC DEPENDENCE, UNCOMPLICATED (HCC): ICD-10-CM

## 2023-03-15 DIAGNOSIS — E03.9 ACQUIRED HYPOTHYROIDISM: ICD-10-CM

## 2023-03-15 DIAGNOSIS — F13.29 SEDATIVE, HYPNOTIC OR ANXIOLYTIC DEPENDENCE WITH UNSPECIFIED SEDATIVE, HYPNOTIC OR ANXIOLYTIC-INDUCED DISORDER (HCC): ICD-10-CM

## 2023-03-15 DIAGNOSIS — D64.9 ANEMIA, UNSPECIFIED TYPE: ICD-10-CM

## 2023-03-15 DIAGNOSIS — E66.01 SEVERE OBESITY (BMI 35.0-39.9) WITH COMORBIDITY (HCC): ICD-10-CM

## 2023-03-15 DIAGNOSIS — K21.9 GASTROESOPHAGEAL REFLUX DISEASE WITHOUT ESOPHAGITIS: ICD-10-CM

## 2023-03-15 DIAGNOSIS — N31.9 BLADDER DYSFUNCTION: ICD-10-CM

## 2023-03-15 DIAGNOSIS — M19.90 ARTHRITIS: ICD-10-CM

## 2023-03-15 DIAGNOSIS — G47.33 OBSTRUCTIVE SLEEP APNEA SYNDROME: ICD-10-CM

## 2023-03-15 DIAGNOSIS — F51.01 PRIMARY INSOMNIA: ICD-10-CM

## 2023-03-15 DIAGNOSIS — N18.31 STAGE 3A CHRONIC KIDNEY DISEASE (HCC): ICD-10-CM

## 2023-03-15 DIAGNOSIS — F13.99 SEDATIVE, HYPNOTIC OR ANXIOLYTIC USE, UNSPECIFIED WITH UNSPECIFIED SEDATIVE, HYPNOTIC OR ANXIOLYTIC-INDUCED DISORDER (HCC): ICD-10-CM

## 2023-03-15 DIAGNOSIS — I10 ESSENTIAL HYPERTENSION: ICD-10-CM

## 2023-03-15 DIAGNOSIS — I10 ESSENTIAL HYPERTENSION: Primary | ICD-10-CM

## 2023-03-15 LAB
ALBUMIN SERPL-MCNC: 4 G/DL (ref 3.5–5.2)
ALP SERPL-CCNC: 69 U/L (ref 35–104)
ALT SERPL-CCNC: 11 U/L (ref 0–32)
ANION GAP SERPL CALCULATED.3IONS-SCNC: 13 MMOL/L (ref 7–16)
AST SERPL-CCNC: 26 U/L (ref 0–31)
BILIRUB SERPL-MCNC: 0.4 MG/DL (ref 0–1.2)
BUN SERPL-MCNC: 13 MG/DL (ref 6–23)
CALCIUM SERPL-MCNC: 9.1 MG/DL (ref 8.6–10.2)
CHLORIDE SERPL-SCNC: 100 MMOL/L (ref 98–107)
CHOLESTEROL, TOTAL: 134 MG/DL (ref 0–199)
CO2 SERPL-SCNC: 22 MMOL/L (ref 22–29)
CREAT SERPL-MCNC: 1 MG/DL (ref 0.5–1)
GLUCOSE SERPL-MCNC: 88 MG/DL (ref 74–99)
HDLC SERPL-MCNC: 69 MG/DL
LDLC SERPL CALC-MCNC: 50 MG/DL (ref 0–99)
POTASSIUM SERPL-SCNC: 3.9 MMOL/L (ref 3.5–5)
PROT SERPL-MCNC: 7.6 G/DL (ref 6.4–8.3)
SODIUM SERPL-SCNC: 135 MMOL/L (ref 132–146)
TRIGL SERPL-MCNC: 73 MG/DL (ref 0–149)
TSH SERPL-MCNC: 3.07 UIU/ML (ref 0.27–4.2)
VLDLC SERPL CALC-MCNC: 15 MG/DL

## 2023-03-15 RX ORDER — AMLODIPINE BESYLATE 10 MG/1
TABLET ORAL
Qty: 90 TABLET | Refills: 1 | Status: SHIPPED | OUTPATIENT
Start: 2023-03-15

## 2023-03-15 RX ORDER — LABETALOL 100 MG/1
TABLET, FILM COATED ORAL
Qty: 60 TABLET | Refills: 5 | Status: SHIPPED | OUTPATIENT
Start: 2023-03-15

## 2023-03-15 RX ORDER — LEVOTHYROXINE SODIUM 0.07 MG/1
TABLET ORAL
Qty: 90 TABLET | Refills: 1 | Status: SHIPPED | OUTPATIENT
Start: 2023-03-15

## 2023-03-15 RX ORDER — TRAMADOL HYDROCHLORIDE 50 MG/1
50 TABLET ORAL EVERY 4 HOURS PRN
Qty: 30 TABLET | Refills: 0 | Status: SHIPPED | OUTPATIENT
Start: 2023-03-15 | End: 2023-03-20

## 2023-03-15 NOTE — PROGRESS NOTES
.  HPI: Patient had a suprapubic catheter placed because she constantly had bladder leakage. Now she has leaking around the suprapubic catheter which obviously is not helping anything. She states that she is in such pain that she basically stays in bed all day. She is extremely deconditioned and it took 2 people to even get her out of the wheelchair today. Her granddaughter is with her today and we discussed going to physical therapy but the first thing she needs to solve is to get this bladder leakage taken care of. She occasionally takes ibuprofen for arthritic pain but I told her that it really probably not in her best interest to take this regularly as it will cause increased peripheral lymphedema. Patient states that she \"just wants to die\". This is actually a longstanding phrase from her. She has no intention of killing herself. Her blood pressure here is reasonably controlled. Her pulse is low but not significantly bradycardic. Allergies: NKDA  PMH:  Problem List: Essential hypertension  Health Maintenance:  Influenza Vaccination - (2020)  Mammogram -   Colonoscopy - 2019 diverticular dx  Medical Problems:  Hypothyroidism, Hypertension, Anxiety, Osteoarthritis  Pulmonary HTN - (2018) [facility]-RIGHT SIDED HEART CATH  Incontinence, Peripheral Neuropathy  Diastolic Dysfunction - () CARDIOLOGY/PULMONARY CONSULTS   Sleep Apnea - (10/15/2019) RECENT ADJUSTMENT OF CPAP  Dizziness- Paul/ Rosemary   Syncope- hospital 2022-resolved symptoms with decrease of Labetalol  Osteoporosis- began Fosamax 3/8/2022  Suprapubic catheter leak 3/15/2023  Surgical Hx:  Cataract Removal, Multiple urologic surgeries including stim, Removal of Gallbladder, Appendectomy,  Partial Hysterectomy  Reviewed, no changes. FH:  Father:  . (Hx)  due to CAD; Throat Cancer. Mother:  . (Hx)  due to Old age ? unknown. Brother 1:  . (Hx)  due to Pulmonary fibrosis. Brother 2:  .  (Hx)  due to MI - age 47. Sister 1:  Coronary Artery Disease (CAD). Maternal Grandmother:  . (Hx)  due to Colon Cancer. Reviewed, no changes. SH:  Marital: Legal Status: . Personal Habits: Cigarette Use: Never Smoked Cigarettes. Alcohol: Social Very Rare. Drug Use:  Denies Drug Use. Reviewed, no changes. Date: 3/15/2023  Was the patient queried about smoking behavior? Yes   Does the patient currently smoke? Smoking: Nonsmoker. Objective  Vitals:    03/15/23 1440   BP: 138/70   Pulse: 63   Temp: 97.1 °F (36.2 °C)   SpO2: 98%   Exam:  Const: Appears healthy, well developed and well nourished. Appears  obese. Eyes: EOMI in both eyes. PERRL. ENMT: External ears WNL. Tympanic membranes are intact. External nose WNL. Neck: Supple and symmetric. Palpation reveals no adenopathy. No masses appreciated. Thyroid:  no nodule appreciated or thyromegaly. No jugular venous distention. Resp: Respirations are unlabored. Respiration rate is normal. Auscultate good airflow. No rales,  rhonchi or wheezes appreciated over the lungs bilaterally. CV: Rhythm is regular. S1 is normal. S2 is accentuated. S4 is audible. Carotids: no bruits. Abdominal aorta: not  palpable. Pedal pulses: 2+ and equal bilaterally. Extremities: No clubbing, cyanosis. Trace to 1+ edema below the mid tibia bilaterally. Abdomen: Bowel sounds are normoactive. Palpation reveals softness, with no distension,  organomegaly or tenderness. No abdominal masses palpable. No palpable hepatosplenomegaly. Musculo: Walks with a wide-based gait. Upper Extremities: ROM: Full ROM bilaterally. Lower  Extremities: ROM: Limitation of range of motion of both knees. Skin: Dry and warm with no rash. Neuro: Alert and oriented x3. Mood is normal. Affect is normal. Speech is articulate and fluent. DTR's are symmetric, intact and 2+ bilaterally. Thalia Snyder was seen today for 6 month follow-up and leg pain.     Diagnoses and all orders for this visit:    Essential hypertension  -     CHANCE - Liudmila Mann MD, Urology, [place]  -     External Referral To Physical Therapy  -     Lipid Panel; Future  -     Comprehensive Metabolic Panel; Future  -     TSH; Future  -     CBC with Auto Differential; Future  -     traMADol (ULTRAM) 50 MG tablet; Take 1 tablet by mouth every 4 hours as needed for Pain for up to 5 days. Intended supply: 5 days. Take lowest dose possible to manage pain Max Daily Amount: 300 mg    Acquired hypothyroidism  -     levothyroxine (SYNTHROID) 75 MCG tablet; One po daily  -     CHANCE Mann MD, Urology, [place]  -     External Referral To Physical Therapy  -     Lipid Panel; Future  -     Comprehensive Metabolic Panel; Future  -     TSH; Future  -     CBC with Auto Differential; Future  -     traMADol (ULTRAM) 50 MG tablet; Take 1 tablet by mouth every 4 hours as needed for Pain for up to 5 days. Intended supply: 5 days. Take lowest dose possible to manage pain Max Daily Amount: 300 mg    Obstructive sleep apnea syndrome  -     CHANCE Mann MD, Urology, [place]  -     External Referral To Physical Therapy  -     Lipid Panel; Future  -     Comprehensive Metabolic Panel; Future  -     TSH; Future  -     CBC with Auto Differential; Future  -     traMADol (ULTRAM) 50 MG tablet; Take 1 tablet by mouth every 4 hours as needed for Pain for up to 5 days. Intended supply: 5 days. Take lowest dose possible to manage pain Max Daily Amount: 300 mg    Gastroesophageal reflux disease without esophagitis  -     CHANCE Mann MD, Urology, [place]  -     External Referral To Physical Therapy  -     Lipid Panel; Future  -     Comprehensive Metabolic Panel; Future  -     TSH; Future  -     CBC with Auto Differential; Future  -     traMADol (ULTRAM) 50 MG tablet; Take 1 tablet by mouth every 4 hours as needed for Pain for up to 5 days. Intended supply: 5 days.  Take lowest dose possible to manage pain Max Daily Amount: 300 mg    Stage 3a chronic kidney disease (HCC)  -     Jaquan Preston MD, Urology, [place]  -     External Referral To Physical Therapy  -     Lipid Panel; Future  -     Comprehensive Metabolic Panel; Future  -     TSH; Future  -     CBC with Auto Differential; Future  -     traMADol (ULTRAM) 50 MG tablet; Take 1 tablet by mouth every 4 hours as needed for Pain for up to 5 days. Intended supply: 5 days. Take lowest dose possible to manage pain Max Daily Amount: 300 mg    Anemia, unspecified type  -     CHANCE - Rosanne High MD, Urology, [place]  -     External Referral To Physical Therapy  -     Lipid Panel; Future  -     Comprehensive Metabolic Panel; Future  -     TSH; Future  -     CBC with Auto Differential; Future  -     traMADol (ULTRAM) 50 MG tablet; Take 1 tablet by mouth every 4 hours as needed for Pain for up to 5 days. Intended supply: 5 days. Take lowest dose possible to manage pain Max Daily Amount: 300 mg    Primary insomnia  -     CHANCE - Rosanne High MD, Urology, [place]  -     External Referral To Physical Therapy  -     Lipid Panel; Future  -     Comprehensive Metabolic Panel; Future  -     TSH; Future  -     CBC with Auto Differential; Future  -     traMADol (ULTRAM) 50 MG tablet; Take 1 tablet by mouth every 4 hours as needed for Pain for up to 5 days. Intended supply: 5 days. Take lowest dose possible to manage pain Max Daily Amount: 300 mg    Bladder dysfunction  -     CHANCE High MD, Urology, [place]    Arthritis  -     External Referral To Physical Therapy    Severe obesity (BMI 35.0-39. 9) with comorbidity (HCC)    Sedative, hypnotic or anxiolytic use, unspecified with unspecified sedative, hypnotic or anxiolytic-induced disorder    Sedative, hypnotic or anxiolytic dependence with unspecified sedative, hypnotic or anxiolytic-induced disorder    Sedative, hypnotic or anxiolytic dependence, uncomplicated    Other orders  -     labetalol (NORMODYNE) 100 MG tablet; TAKE 1/2 TABLET BY MOUTH IN THE MORNING, AND 1/2 TABLET BEFORE BEDTIME  -     amLODIPine (NORVASC) 10 MG tablet; TAKE ONE TABLET BY MOUTH DAILY  Suprapubic catheter appears to be slightly loose. She will need this   Adjusted by urology. After this then    She can restart her loop diuretic and restart physical therapy. Lab work will be obtained today. I did give her tramadol for discomfort. This is a short course to see how she tolerates the medication and degree of pain relief.

## 2023-03-16 DIAGNOSIS — F10.10 ETOH ABUSE: ICD-10-CM

## 2023-03-16 DIAGNOSIS — D75.89 MACROCYTOSIS: Primary | ICD-10-CM

## 2023-03-16 LAB
BASOPHILS # BLD: 0.17 E9/L (ref 0–0.2)
BASOPHILS NFR BLD: 1.9 % (ref 0–2)
EOSINOPHIL # BLD: 0.69 E9/L (ref 0.05–0.5)
EOSINOPHIL NFR BLD: 7.8 % (ref 0–6)
ERYTHROCYTE [DISTWIDTH] IN BLOOD BY AUTOMATED COUNT: 15.5 FL (ref 11.5–15)
HCT VFR BLD AUTO: 47.8 % (ref 34–48)
HGB BLD-MCNC: 15.1 G/DL (ref 11.5–15.5)
IMM GRANULOCYTES # BLD: 0.1 E9/L
IMM GRANULOCYTES NFR BLD: 1.1 % (ref 0–5)
LYMPHOCYTES # BLD: 2.28 E9/L (ref 1.5–4)
LYMPHOCYTES NFR BLD: 25.9 % (ref 20–42)
MCH RBC QN AUTO: 32 PG (ref 26–35)
MCHC RBC AUTO-ENTMCNC: 31.6 % (ref 32–34.5)
MCV RBC AUTO: 101.3 FL (ref 80–99.9)
MONOCYTES # BLD: 0.82 E9/L (ref 0.1–0.95)
MONOCYTES NFR BLD: 9.3 % (ref 2–12)
NEUTROPHILS # BLD: 4.74 E9/L (ref 1.8–7.3)
NEUTS SEG NFR BLD: 54 % (ref 43–80)
PLATELET # BLD AUTO: 317 E9/L (ref 130–450)
PMV BLD AUTO: 11.9 FL (ref 7–12)
RBC # BLD AUTO: 4.72 E12/L (ref 3.5–5.5)
WBC # BLD: 8.8 E9/L (ref 4.5–11.5)

## 2023-03-21 ENCOUNTER — TELEPHONE (OUTPATIENT)
Dept: FAMILY MEDICINE CLINIC | Age: 78
End: 2023-03-21

## 2023-03-21 DIAGNOSIS — F10.10 ETOH ABUSE: ICD-10-CM

## 2023-03-21 DIAGNOSIS — D75.89 MACROCYTOSIS: ICD-10-CM

## 2023-03-21 LAB
FOLATE SERPL-MCNC: 12.7 NG/ML (ref 4.8–24.2)
VIT B12 SERPL-MCNC: 664 PG/ML (ref 211–946)

## 2023-03-21 NOTE — TELEPHONE ENCOUNTER
----- Message from Gilford Grimes sent at 3/20/2023  1:08 PM EDT -----  Subject: Referral Request    Reason for referral request? [facility]  Provider patient wants to be referred to(if known):     Provider Phone Number(if known): Additional Information for Provider? Pt called and said they never got the   fax. Pt would like the fax to be resend to [facility] urology. Pt would like a   call back.    ---------------------------------------------------------------------------  --------------  Tera Kessler [id number]    [phone]; OK to leave message on voicemail  ---------------------------------------------------------------------------  --------------

## 2023-03-26 LAB
Lab: NORMAL
REPORT: NORMAL
THIS TEST SENT TO: NORMAL

## 2023-04-07 DIAGNOSIS — F51.01 PRIMARY INSOMNIA: Primary | ICD-10-CM

## 2023-04-07 RX ORDER — ZOLPIDEM TARTRATE 10 MG/1
10 TABLET ORAL NIGHTLY PRN
Qty: 30 TABLET | Refills: 2 | Status: SHIPPED | OUTPATIENT
Start: 2023-04-07 | End: 2023-07-06

## 2023-04-07 NOTE — TELEPHONE ENCOUNTER
Patient is out of Ambien, she did not realize that her refills were . She did not have a dose last night and would like a refill today at [facility] if possible.      Last Appointment:  3/15/2023  Future Appointments   Date Time Provider Lorri Sequeira   2023  1:30 PM MD Wesley Simon [facility]    '

## 2023-04-30 DIAGNOSIS — R05.9 COUGH: ICD-10-CM

## 2023-05-01 ENCOUNTER — TELEPHONE (OUTPATIENT)
Dept: FAMILY MEDICINE CLINIC | Age: 78
End: 2023-05-01

## 2023-05-01 RX ORDER — CODEINE PHOSPHATE AND GUAIFENESIN 10; 100 MG/5ML; MG/5ML
SOLUTION ORAL
Qty: 240 ML | Refills: 0 | OUTPATIENT
Start: 2023-05-01

## 2023-05-01 NOTE — TELEPHONE ENCOUNTER
She can see anyone in that urology practice. They should be able to figure out who has an opening sooner. She does not specifically need to go to Dr. Gilbert Gutierrez.

## 2023-05-01 NOTE — TELEPHONE ENCOUNTER
Ivis's daughter Lorine Crigler calling in; states that Jeremías Rivas is not able to get into Dr. Lisa Mohamud for a couple of months. She mentioned that you had also maybe mentioned another Dr to her, and she was wondering who that provider was and if you could place a referral for them instead, so she can possibly be seen sooner. Please call daughter, Lorine Crigler, back with this information or if you need any follow up information.

## 2023-05-03 DIAGNOSIS — R05.9 COUGH: ICD-10-CM

## 2023-05-03 RX ORDER — CODEINE PHOSPHATE AND GUAIFENESIN 10; 100 MG/5ML; MG/5ML
5 SOLUTION ORAL 3 TIMES DAILY PRN
Qty: 240 ML | Refills: 1 | Status: SHIPPED | OUTPATIENT
Start: 2023-05-03 | End: 2023-06-02

## 2023-05-03 NOTE — TELEPHONE ENCOUNTER
Tried to call Yuli, got no answer. LVM and let her know that there were multiple physicians in that practice, and she did not necessarily have to see Dr. Mau Oshea but could see whomever in the practice had the first opening. I did tell her that if that did not work out, or they had further questions, to give us a call back. Provided our phone number for her in case she needed it.

## 2023-05-03 NOTE — TELEPHONE ENCOUNTER
Last Appointment:  3/15/2023  Future Appointments   Date Time Provider Lorri Sequeira   7/19/2023  1:30 PM Kwesi Miller  [address] [place] [address]

## 2023-05-28 DIAGNOSIS — F41.9 ANXIETY: ICD-10-CM

## 2023-05-30 RX ORDER — LORAZEPAM 0.5 MG/1
TABLET ORAL
Qty: 90 TABLET | Refills: 0 | Status: SHIPPED | OUTPATIENT
Start: 2023-05-30 | End: 2023-08-28

## 2023-06-06 RX ORDER — DULOXETIN HYDROCHLORIDE 30 MG/1
30 CAPSULE, DELAYED RELEASE ORAL 3 TIMES DAILY
Qty: 90 CAPSULE | Refills: 0 | Status: SHIPPED | OUTPATIENT
Start: 2023-06-06

## 2023-07-04 DIAGNOSIS — K21.9 GASTROESOPHAGEAL REFLUX DISEASE WITHOUT ESOPHAGITIS: ICD-10-CM

## 2023-07-04 DIAGNOSIS — F51.01 PRIMARY INSOMNIA: ICD-10-CM

## 2023-07-05 RX ORDER — PANTOPRAZOLE SODIUM 40 MG/1
TABLET, DELAYED RELEASE ORAL
Qty: 90 TABLET | Refills: 0 | Status: SHIPPED | OUTPATIENT
Start: 2023-07-05

## 2023-07-05 RX ORDER — ZOLPIDEM TARTRATE 10 MG/1
TABLET ORAL
Qty: 90 TABLET | Refills: 0 | Status: SHIPPED | OUTPATIENT
Start: 2023-07-05 | End: 2023-10-03

## 2023-07-05 RX ORDER — ROSUVASTATIN CALCIUM 20 MG/1
TABLET, COATED ORAL
Qty: 90 TABLET | Refills: 0 | Status: SHIPPED | OUTPATIENT
Start: 2023-07-05

## 2023-07-05 NOTE — TELEPHONE ENCOUNTER
Last Appointment:  3/15/2023  Future Appointments   Date Time Provider [address]   7/19/2023  1:30 PM Andrea Turner MD [facility]   7/19/2023  1:45 PM Adnrea Turner MD [address] [place] [address]

## 2023-07-11 RX ORDER — DULOXETIN HYDROCHLORIDE 30 MG/1
CAPSULE, DELAYED RELEASE ORAL
Qty: 90 CAPSULE | Refills: 0 | Status: SHIPPED | OUTPATIENT
Start: 2023-07-11

## 2023-07-11 NOTE — TELEPHONE ENCOUNTER
Last Appointment:  3/15/2023  Future Appointments   Date Time Provider [address]   7/19/2023  1:30 PM MD Laquita Soares [facility]   7/19/2023  1:45 PM Nancy Peters MD [address] [place] [address]

## 2023-07-19 ENCOUNTER — OFFICE VISIT (OUTPATIENT)
Dept: FAMILY MEDICINE CLINIC | Age: 78
End: 2023-07-19
Payer: MEDICARE

## 2023-07-19 VITALS — BODY MASS INDEX: 34.86 KG/M2 | WEIGHT: 216 LBS

## 2023-07-19 VITALS
WEIGHT: 216 LBS | HEART RATE: 70 BPM | BODY MASS INDEX: 34.86 KG/M2 | TEMPERATURE: 97.3 F | SYSTOLIC BLOOD PRESSURE: 140 MMHG | DIASTOLIC BLOOD PRESSURE: 80 MMHG | OXYGEN SATURATION: 95 %

## 2023-07-19 DIAGNOSIS — F41.9 ANXIETY: ICD-10-CM

## 2023-07-19 DIAGNOSIS — I10 ESSENTIAL HYPERTENSION: Primary | ICD-10-CM

## 2023-07-19 DIAGNOSIS — K21.9 GASTROESOPHAGEAL REFLUX DISEASE WITHOUT ESOPHAGITIS: ICD-10-CM

## 2023-07-19 DIAGNOSIS — G47.33 OBSTRUCTIVE SLEEP APNEA SYNDROME: ICD-10-CM

## 2023-07-19 DIAGNOSIS — M81.0 AGE RELATED OSTEOPOROSIS, UNSPECIFIED PATHOLOGICAL FRACTURE PRESENCE: ICD-10-CM

## 2023-07-19 DIAGNOSIS — E03.9 ACQUIRED HYPOTHYROIDISM: ICD-10-CM

## 2023-07-19 DIAGNOSIS — Z00.00 ENCOUNTER FOR SUBSEQUENT ANNUAL WELLNESS VISIT (AWV) IN MEDICARE PATIENT: ICD-10-CM

## 2023-07-19 DIAGNOSIS — Z00.00 INITIAL MEDICARE ANNUAL WELLNESS VISIT: Primary | ICD-10-CM

## 2023-07-19 DIAGNOSIS — I10 ESSENTIAL HYPERTENSION: ICD-10-CM

## 2023-07-19 LAB
ABSOLUTE IMMATURE GRANULOCYTE: <0.03 K/UL (ref 0–0.58)
ALBUMIN SERPL-MCNC: 4.7 G/DL (ref 3.5–5.2)
ALP BLD-CCNC: 71 U/L (ref 35–104)
ALT SERPL-CCNC: 9 U/L (ref 0–32)
ANION GAP SERPL CALCULATED.3IONS-SCNC: 15 MMOL/L (ref 7–16)
AST SERPL-CCNC: 28 U/L (ref 0–31)
BASOPHILS ABSOLUTE: 0.1 K/UL (ref 0–0.2)
BASOPHILS RELATIVE PERCENT: 1 % (ref 0–2)
BILIRUB SERPL-MCNC: 0.4 MG/DL (ref 0–1.2)
BUN BLDV-MCNC: 15 MG/DL (ref 6–23)
CALCIUM SERPL-MCNC: 9.7 MG/DL (ref 8.6–10.2)
CHLORIDE BLD-SCNC: 99 MMOL/L (ref 98–107)
CO2: 21 MMOL/L (ref 22–29)
CREAT SERPL-MCNC: 1.2 MG/DL (ref 0.5–1)
EOSINOPHILS ABSOLUTE: 0.65 K/UL (ref 0.05–0.5)
EOSINOPHILS RELATIVE PERCENT: 8 % (ref 0–6)
FOLATE: 17.1 NG/ML (ref 4.8–24.2)
GFR SERPL CREATININE-BSD FRML MDRD: 48 ML/MIN/1.73M2
GLUCOSE BLD-MCNC: 71 MG/DL (ref 74–99)
HCT VFR BLD CALC: 41.2 % (ref 34–48)
HEMOGLOBIN: 13.1 G/DL (ref 11.5–15.5)
IMMATURE GRANULOCYTES: 0 % (ref 0–5)
LYMPHOCYTES ABSOLUTE: 2.16 K/UL (ref 1.5–4)
LYMPHOCYTES RELATIVE PERCENT: 26 % (ref 20–42)
MCH RBC QN AUTO: 31.9 PG (ref 26–35)
MCHC RBC AUTO-ENTMCNC: 31.8 G/DL (ref 32–34.5)
MCV RBC AUTO: 100.2 FL (ref 80–99.9)
MONOCYTES ABSOLUTE: 1.03 K/UL (ref 0.1–0.95)
MONOCYTES RELATIVE PERCENT: 13 % (ref 2–12)
NEUTROPHILS ABSOLUTE: 4.31 K/UL (ref 1.8–7.3)
NEUTROPHILS RELATIVE PERCENT: 52 % (ref 43–80)
PDW BLD-RTO: 14.6 % (ref 11.5–15)
PLATELET # BLD: 358 K/UL (ref 130–450)
PMV BLD AUTO: 10.1 FL (ref 7–12)
POTASSIUM SERPL-SCNC: 4 MMOL/L (ref 3.5–5)
RBC # BLD: 4.11 M/UL (ref 3.5–5.5)
SODIUM BLD-SCNC: 135 MMOL/L (ref 132–146)
TOTAL PROTEIN: 8.4 G/DL (ref 6.4–8.3)
VITAMIN B-12: 526 PG/ML (ref 211–946)
WBC # BLD: 8.3 K/UL (ref 4.5–11.5)

## 2023-07-19 PROCEDURE — 99214 OFFICE O/P EST MOD 30 MIN: CPT | Performed by: INTERNAL MEDICINE

## 2023-07-19 PROCEDURE — 1123F ACP DISCUSS/DSCN MKR DOCD: CPT | Performed by: INTERNAL MEDICINE

## 2023-07-19 PROCEDURE — 3077F SYST BP >= 140 MM HG: CPT | Performed by: INTERNAL MEDICINE

## 2023-07-19 PROCEDURE — G0438 PPPS, INITIAL VISIT: HCPCS | Performed by: INTERNAL MEDICINE

## 2023-07-19 PROCEDURE — 3079F DIAST BP 80-89 MM HG: CPT | Performed by: INTERNAL MEDICINE

## 2023-07-19 RX ORDER — PANTOPRAZOLE SODIUM 40 MG/1
40 TABLET, DELAYED RELEASE ORAL DAILY
Qty: 90 TABLET | Refills: 1 | Status: SHIPPED | OUTPATIENT
Start: 2023-07-19

## 2023-07-19 RX ORDER — DULOXETIN HYDROCHLORIDE 30 MG/1
CAPSULE, DELAYED RELEASE ORAL
Qty: 90 CAPSULE | Refills: 1 | Status: SHIPPED | OUTPATIENT
Start: 2023-07-19

## 2023-07-19 RX ORDER — ROSUVASTATIN CALCIUM 20 MG/1
20 TABLET, COATED ORAL DAILY
Qty: 90 TABLET | Refills: 1 | Status: SHIPPED | OUTPATIENT
Start: 2023-07-19

## 2023-07-19 RX ORDER — LORAZEPAM 0.5 MG/1
0.5 TABLET ORAL EVERY 8 HOURS PRN
Qty: 90 TABLET | Refills: 0 | Status: SHIPPED | OUTPATIENT
Start: 2023-07-19 | End: 2023-10-17

## 2023-07-19 ASSESSMENT — PATIENT HEALTH QUESTIONNAIRE - PHQ9
SUM OF ALL RESPONSES TO PHQ QUESTIONS 1-9: 0
SUM OF ALL RESPONSES TO PHQ9 QUESTIONS 1 & 2: 0
SUM OF ALL RESPONSES TO PHQ QUESTIONS 1-9: 0
2. FEELING DOWN, DEPRESSED OR HOPELESS: 0
1. LITTLE INTEREST OR PLEASURE IN DOING THINGS: 0

## 2023-07-19 ASSESSMENT — LIFESTYLE VARIABLES
HOW MANY STANDARD DRINKS CONTAINING ALCOHOL DO YOU HAVE ON A TYPICAL DAY: 1 OR 2
HOW OFTEN DO YOU HAVE A DRINK CONTAINING ALCOHOL: 2-4 TIMES A MONTH

## 2023-07-19 NOTE — PROGRESS NOTES
.  HPI: She continues to have some discomfort around the suprapubic catheter. Her main issue however is fatigue. I believe this is probably multifactorial but in large part I believe it secondary to pain issues which are not well controlled. Most of this is coming from her back. Patient struggles with functioning. She would like to be more active but cannot be so because of pain issues. She previously did see a pain management physician at [address] [place] [address] where her son works. I will contact the son and see if more aggressive therapies can be to possibly contemplate it. Lab work done previously does not indicate a good etiology for the fatigue issue. Patient sleeps up to 12 hours per night and she states that she likes to stay in bed because that is the least painful position for her to be in. She has tried physical therapy without much success infection states to cause more discomfort. She really not having cardiac or respiratory symptoms. She denies any black stools or blood in the stools. She does have sleep apnea and she is using her CPAP consistently. She has developed some symptoms consistent with left TMJ. I have asked her to contact her dentist regarding this problem. She made do well with a bite block.   Allergies: NKDA  PMH:  Problem List: Essential hypertension  Health Maintenance:  Influenza Vaccination - (12/18/2020)  Mammogram - 2017  Colonoscopy - 9/2019 diverticular dx  Medical Problems:  Hypothyroidism, Hypertension, Anxiety, Osteoarthritis  Pulmonary HTN - (5/2018) [facility]-RIGHT SIDED HEART CATH  Incontinence, Peripheral Neuropathy  Diastolic Dysfunction - (3/72/5719) CARDIOLOGY/PULMONARY CONSULTS 7/18  Sleep Apnea - (10/15/2019) RECENT ADJUSTMENT OF CPAP  Dizziness- Paul/ Rosemary 2021  Syncope- hospital 2/2022-resolved symptoms with decrease of Labetalol  Osteoporosis- began Fosamax 3/8/2022  Suprapubic catheter leak 3/15/2023  Surgical Hx:  Cataract Removal, Multiple urologic

## 2023-09-15 RX ORDER — AMLODIPINE BESYLATE 10 MG/1
TABLET ORAL
Qty: 90 TABLET | Refills: 0 | Status: SHIPPED | OUTPATIENT
Start: 2023-09-15

## 2023-09-15 NOTE — TELEPHONE ENCOUNTER
Medication(s) pended?    [x] Yes  [] No    Last Appointment:  7/19/2023    Future appts:  Future Appointments   Date Time Provider [address]   1/18/2024  1:00 PM MD Zeb Phillipsony

## 2023-10-04 DIAGNOSIS — F51.01 PRIMARY INSOMNIA: ICD-10-CM

## 2023-10-04 RX ORDER — ZOLPIDEM TARTRATE 10 MG/1
TABLET ORAL
Qty: 90 TABLET | Refills: 0 | Status: SHIPPED | OUTPATIENT
Start: 2023-10-04 | End: 2024-01-04

## 2023-10-04 NOTE — TELEPHONE ENCOUNTER
Name of Medication(s) Requested:  zolpidem    Pharmacy Requested:   [facility]    Medication(s) pended? [] Yes  [] No    Last Appointment:  7/19/2023    Future appts:  Future Appointments   Date Time Provider [address] [place] [address]   1/18/2024  1:00 PM Savage Reed MD [facility]          Does patient need call back?   [] Yes  [] No

## 2023-10-05 RX ORDER — ZOLPIDEM TARTRATE 10 MG/1
TABLET ORAL
Qty: 90 TABLET | Refills: 0 | OUTPATIENT
Start: 2023-10-05

## 2023-10-06 RX ORDER — ROSUVASTATIN CALCIUM 20 MG/1
20 TABLET, COATED ORAL DAILY
Qty: 90 TABLET | Refills: 1 | Status: SHIPPED | OUTPATIENT
Start: 2023-10-06

## 2023-10-06 NOTE — TELEPHONE ENCOUNTER
Last Appointment:  7/19/2023  Future Appointments   Date Time Provider [address]   1/18/2024  1:00 PM Maira Corbett MD [facility]

## 2023-10-10 RX ORDER — DULOXETIN HYDROCHLORIDE 30 MG/1
CAPSULE, DELAYED RELEASE ORAL
Qty: 90 CAPSULE | Refills: 0 | Status: SHIPPED | OUTPATIENT
Start: 2023-10-10

## 2023-11-16 RX ORDER — DULOXETIN HYDROCHLORIDE 30 MG/1
CAPSULE, DELAYED RELEASE ORAL
Qty: 90 CAPSULE | Refills: 0 | Status: SHIPPED | OUTPATIENT
Start: 2023-11-16

## 2023-11-16 NOTE — TELEPHONE ENCOUNTER
Last Appointment:  7/19/2023  Future Appointments   Date Time Provider [address]   1/18/2024  1:00 PM Conchis Hdez MD [facility] [address] calling to get a refill on Jihan cheema.

## 2023-11-20 DIAGNOSIS — R05.9 COUGH: ICD-10-CM

## 2023-11-20 RX ORDER — CODEINE PHOSPHATE AND GUAIFENESIN 10; 100 MG/5ML; MG/5ML
5 SOLUTION ORAL 3 TIMES DAILY PRN
Qty: 240 ML | Refills: 1 | Status: SHIPPED | OUTPATIENT
Start: 2023-11-20 | End: 2023-12-20

## 2023-11-20 NOTE — TELEPHONE ENCOUNTER
Patient is asking for refill of her cough medicine. She said she has been getting it from you for years.

## 2024-03-15 NOTE — TELEPHONE ENCOUNTER
Order sent.   Thanks
Patient calling in, CPAP broke throughout night.  She needs a new order sent to [facility]. I have pended
faxed
Attending and PA/NP shared services statement (NON-critical care):

## (undated) DEVICE — TUBING, SUCTION, 3/16" X 12', STRAIGHT: Brand: MEDLINE

## (undated) DEVICE — CATHETER,FOLEY,SILI-ELAST,LTX,30FR,10ML: Brand: MEDLINE

## (undated) DEVICE — COUNTER NDL 30 COUNT DBL MAG

## (undated) DEVICE — SOLUTION IRRIGATION STRL H2O 1000 ML UROMATIC CONTAINER

## (undated) DEVICE — 4-PORT MANIFOLD: Brand: NEPTUNE 2

## (undated) DEVICE — DRAINBAG,ANTI-REFLUX TOWER,L/F,2000ML,LL: Brand: MEDLINE

## (undated) DEVICE — BAG DRNGE COMB PK

## (undated) DEVICE — SPONGE,DRAIN,NONWVN,4"X4",6PLY,STRL,LF: Brand: MEDLINE

## (undated) DEVICE — GAUZE,SPONGE,4"X4",8PLY,STRL,LF,10/TRAY: Brand: MEDLINE

## (undated) DEVICE — SOLUTION IRRIG 3000ML 0.9% SOD CHL USP UROMATIC PLAS CONT

## (undated) DEVICE — GOWN,SIRUS,FABRNF,XL,20/CS: Brand: MEDLINE

## (undated) DEVICE — SYRINGE, LUER LOCK, 10ML: Brand: MEDLINE

## (undated) DEVICE — SOLUTION SURG PREP ANTIMICROBIAL 4 OZ SKIN WND EXIDINE

## (undated) DEVICE — SOLUTION IV IRRIG POUR BRL 0.9% SODIUM CHL 2F7124

## (undated) DEVICE — SOLUTION IRRIG 3000ML STRL H2O USP UROMATIC PLAS CONT

## (undated) DEVICE — CYSTO PACK: Brand: MEDLINE INDUSTRIES, INC.

## (undated) DEVICE — GLOVE ORANGE PI 7 1/2   MSG9075

## (undated) DEVICE — NEEDLE FLTR 18GA L1.5IN MEM THK5UM BLNT DISP

## (undated) DEVICE — INTENDED FOR TISSUE SEPARATION, AND OTHER PROCEDURES THAT REQUIRE A SHARP SURGICAL BLADE TO PUNCTURE OR CUT.: Brand: BARD-PARKER ® STAINLESS STEEL BLADES